# Patient Record
Sex: FEMALE | Race: WHITE | NOT HISPANIC OR LATINO | Employment: OTHER | URBAN - NONMETROPOLITAN AREA
[De-identification: names, ages, dates, MRNs, and addresses within clinical notes are randomized per-mention and may not be internally consistent; named-entity substitution may affect disease eponyms.]

---

## 2019-08-13 ENCOUNTER — OFFICE VISIT (OUTPATIENT)
Dept: INTERNAL MEDICINE | Facility: CLINIC | Age: 84
End: 2019-08-13

## 2019-08-13 VITALS
WEIGHT: 103.4 LBS | HEIGHT: 66 IN | TEMPERATURE: 99.2 F | OXYGEN SATURATION: 96 % | DIASTOLIC BLOOD PRESSURE: 84 MMHG | BODY MASS INDEX: 16.62 KG/M2 | SYSTOLIC BLOOD PRESSURE: 130 MMHG | RESPIRATION RATE: 16 BRPM | HEART RATE: 73 BPM

## 2019-08-13 DIAGNOSIS — I10 ESSENTIAL HYPERTENSION: Primary | ICD-10-CM

## 2019-08-13 DIAGNOSIS — E55.9 VITAMIN D DEFICIENCY: ICD-10-CM

## 2019-08-13 DIAGNOSIS — R63.4 WEIGHT LOSS: ICD-10-CM

## 2019-08-13 PROCEDURE — 99203 OFFICE O/P NEW LOW 30 MIN: CPT | Performed by: INTERNAL MEDICINE

## 2019-08-13 RX ORDER — ATENOLOL 50 MG/1
50 TABLET ORAL DAILY
Qty: 30 TABLET | Refills: 6 | Status: SHIPPED | OUTPATIENT
Start: 2019-08-13 | End: 2020-01-01 | Stop reason: SDUPTHER

## 2019-08-13 RX ORDER — HYDROCHLOROTHIAZIDE 25 MG/1
25 TABLET ORAL DAILY
Refills: 1 | COMMUNITY
Start: 2019-05-23 | End: 2019-08-13 | Stop reason: SDUPTHER

## 2019-08-13 RX ORDER — ERGOCALCIFEROL 1.25 MG/1
50000 CAPSULE ORAL WEEKLY
Qty: 5 CAPSULE | Refills: 2 | Status: SHIPPED | OUTPATIENT
Start: 2019-08-13 | End: 2019-09-19

## 2019-08-13 RX ORDER — ERGOCALCIFEROL 1.25 MG/1
50000 CAPSULE ORAL WEEKLY
Refills: 2 | COMMUNITY
Start: 2019-07-02 | End: 2019-08-13 | Stop reason: SDUPTHER

## 2019-08-13 RX ORDER — ATENOLOL 50 MG/1
50 TABLET ORAL DAILY
Refills: 6 | COMMUNITY
Start: 2019-07-12 | End: 2019-08-13 | Stop reason: SDUPTHER

## 2019-08-13 RX ORDER — HYDROCHLOROTHIAZIDE 25 MG/1
25 TABLET ORAL DAILY
Qty: 30 TABLET | Refills: 1 | Status: SHIPPED | OUTPATIENT
Start: 2019-08-13 | End: 2019-09-19

## 2019-08-13 NOTE — PROGRESS NOTES
Subjective     Chief Complaint   Patient presents with   • Establish Care   • Weight Loss       Weight Loss   This is a new problem. The current episode started more than 1 month ago. The problem occurs constantly. The problem has been gradually worsening. Associated symptoms include fatigue and weakness. Pertinent negatives include no abdominal pain, anorexia, arthralgias, chest pain, chills, coughing, fever, joint swelling, nausea or vomiting. Nothing aggravates the symptoms. She has tried eating for the symptoms. The treatment provided no relief.       Patient's PMR from outside medical facility reviewed and noted.    Review of Systems   Constitutional: Positive for fatigue, unexpected weight change and weight loss. Negative for activity change, appetite change, chills and fever.   HENT: Negative for mouth sores and trouble swallowing.         Wears dentures. [place] [employer].    Respiratory: Positive for shortness of breath. Negative for cough and wheezing.    Cardiovascular: Negative for chest pain, palpitations and leg swelling.   Gastrointestinal: Positive for constipation. Negative for abdominal pain, anorexia, diarrhea, nausea and vomiting.   Genitourinary: Negative for dysuria and hematuria.   Musculoskeletal: Negative for arthralgias and joint swelling.   Skin: Negative for color change and wound.   Neurological: Positive for weakness. Negative for speech difficulty.   Psychiatric/Behavioral: Negative for behavioral problems and confusion.      89-year-old  female who presents to [facility] and to discuss her weight loss.  She states that she has been losing weight recently.  She has no complaints, no joint pain.  She states that she is fatigued.  She states that she wears O2 at night, and occasionally in the morning she gets shortness of breath.  She continues to work every day.  She states that she sleeps good, sometimes all the time.  She states she is up-to-date with her mammogram, and 2 to 3  years ago and  build her last colonoscopy, he told her not to return.  The patient has occasional constipation.  She states over the last 6 months she is probably lost about 10 pounds.  She states that she eats an appropriate diet.  Last night for dinner she had green beans, potatoes with butter, pork chops, cottage cheese and tomato.  She denies any type of GI symptoms to include nausea blood in her stool or urine abdominal pain or diarrhea.  She denies any type of skin changes.  Patient did state that her previous PCP had done CAT scans and requested follow-up, but patient did not know what the changes were on scans and did not know why she requested follow-up.  The patient also continues to drive, and states that she feels like she is doing well.    Otherwise complete ROS reviewed and negative except as mentioned in the HPI.    Past Medical History:   Past Medical History:   Diagnosis Date   • Hypertension      Past Surgical History:  Past Surgical History:   Procedure Laterality Date   • CHOLECYSTECTOMY     • HYSTERECTOMY       Social History:  reports that she has never smoked. She has never used smokeless tobacco. She reports that she drinks alcohol. She reports that she does not use drugs.    Family History: family history includes Cancer in her sister; Heart attack in her brother; Lung disease in her father.       Allergies:  No Known Allergies  Medications:  Prior to Admission medications    Medication Sig Start Date End Date Taking? Authorizing Provider   atenolol (TENORMIN) 50 MG tablet Take 1 tablet by mouth Daily. 8/13/19  Yes Love Cordon, DO   hydrochlorothiazide (HYDRODIURIL) 25 MG tablet Take 1 tablet by mouth Daily. 8/13/19  Yes Love Cordon DO   atenolol (TENORMIN) 50 MG tablet Take 50 mg by mouth Daily. 7/12/19 8/13/19 Yes Juan Mo MD   hydrochlorothiazide (HYDRODIURIL) 25 MG tablet Take 25 mg by mouth Daily. 5/23/19 8/13/19 Yes Juan Mo MD   vitamin D  "(ERGOCALCIFEROL) 22748 units capsule capsule Take 1 capsule by mouth 1 (One) Time Per Week. 8/13/19   Love Cordon DO   vitamin D (ERGOCALCIFEROL) 01647 units capsule capsule Take 50,000 Units by mouth 1 (One) Time Per Week. 7/2/19 8/13/19  Provider, MD Juan       Objective     Vital Signs: /84 (BP Location: Left arm, Patient Position: Sitting, Cuff Size: Adult)   Pulse 73   Temp 99.2 °F (37.3 °C) (Temporal)   Resp 16   Ht 166.4 cm (65.5\")   Wt 46.9 kg (103 lb 6.4 oz)   SpO2 96%   Breastfeeding? No   BMI 16.94 kg/m²   Physical Exam   Constitutional:   Thin and frail   HENT:   Head: Normocephalic and atraumatic.   Nose: Nose normal.   Mouth/Throat: Oropharynx is clear and moist.   Right eye medial abnormality.   Eyes: Conjunctivae and EOM are normal. No scleral icterus.   Neck: Normal range of motion. Neck supple.   Cardiovascular: Normal rate, regular rhythm and normal heart sounds.   Pulmonary/Chest: Effort normal and breath sounds normal.   Abdominal: Soft. Bowel sounds are normal. There is no tenderness. There is no guarding.   Musculoskeletal: She exhibits no edema or tenderness.   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    Neurological: She is alert. No cranial nerve deficit. She exhibits normal muscle tone.   Skin: Skin is warm and dry.   Fine varicose veins, bilateral legs.    Psychiatric: She has a normal mood and affect. Thought content normal.   Vitals reviewed.      Patient's Body mass index is 16.94 kg/m². BMI is within normal parameters. No follow-up required..      Results Reviewed:  No results found for: GLUCOSE, BUN, CREATININE, NA, K, CL, CO2, CALCIUM, ALT, AST, WBC, HCT, PLT, CHOL, TRIG, HDL, LDL, LDLHDL, HGBA1C      Assessment / Plan     Assessment/Plan:  1. Essential hypertension  - atenolol (TENORMIN) 50 MG tablet; Take 1 tablet by mouth Daily.  Dispense: 30 tablet; Refill: 6  - hydrochlorothiazide (HYDRODIURIL) 25 MG tablet; Take 1 tablet by mouth " Daily.  Dispense: 30 tablet; Refill: 1  - Lipid panel; Future  - Lipid panel    Would like to review patient's CR and sodium. May need to DC HCTZ. Discussed holding this medication and following her BP at home.     2. Vitamin D deficiency  - vitamin D (ERGOCALCIFEROL) 68040 units capsule capsule; Take 1 capsule by mouth 1 (One) Time Per Week.  Dispense: 5 capsule; Refill: 2    3. Weight loss  - TSH; Future  - T4, free; Future    Will review patient's labs and CT from [facility]. Call or have nurses visit if anything needs to be followed.     Return in about 6 months (around 2/13/2020) for Next scheduled follow up. unless patient needs to be seen sooner or acute issues arise.    Code Status: Full       Love Cordon, DO   08/13/2019

## 2019-08-14 ENCOUNTER — TELEPHONE (OUTPATIENT)
Dept: INTERNAL MEDICINE | Facility: CLINIC | Age: 84
End: 2019-08-14

## 2019-08-14 NOTE — TELEPHONE ENCOUNTER
Attempted to contact patient regarding missed fasting lab appointment. Left voicemail on both home and mobile phone to please call office.

## 2019-08-15 ENCOUNTER — CLINICAL SUPPORT (OUTPATIENT)
Dept: INTERNAL MEDICINE | Facility: CLINIC | Age: 84
End: 2019-08-15

## 2019-08-15 ENCOUNTER — DOCUMENTATION (OUTPATIENT)
Dept: INTERNAL MEDICINE | Facility: CLINIC | Age: 84
End: 2019-08-15

## 2019-08-15 DIAGNOSIS — R91.8 ABNORMAL CT SCAN OF LUNG: Primary | ICD-10-CM

## 2019-08-15 NOTE — PROGRESS NOTES
Patient came into the office today for blood work. Had nurses visit. I got all the labs from [facility] and radiology reports. We discussed labs, she does not need the ordered labs for today, as they were already completed. And we discussed the CT reports which recommend repeat in a couple weeks. Will repeat CT chest today for concern of tumor and/or possible PNA.   Await results.  Love Cordon, DO

## 2019-08-20 ENCOUNTER — TELEPHONE (OUTPATIENT)
Dept: INTERNAL MEDICINE | Facility: CLINIC | Age: 84
End: 2019-08-20

## 2019-08-20 NOTE — TELEPHONE ENCOUNTER
Called [facility] for pt, to inform her that Dr Cordon wants to ref. Her to Pulmonologist,    (I did not leave this on voice mail,  Pt has poss endo broch lesion),   Dr Cordon said if pt has questions she would be happy to speak with pt.

## 2019-08-22 DIAGNOSIS — R91.8 ABNORMAL CT SCAN OF LUNG: ICD-10-CM

## 2019-08-26 ENCOUNTER — TELEPHONE (OUTPATIENT)
Dept: INTERNAL MEDICINE | Facility: CLINIC | Age: 84
End: 2019-08-26

## 2019-08-26 NOTE — TELEPHONE ENCOUNTER
Called and spoke with pt,  She wants to just wait and talk with Dr Cordon about all of this, I told her that would be fine.

## 2019-08-26 NOTE — TELEPHONE ENCOUNTER
Pt called and mentioned she was in Hospital from falling Sunday and broke her shoulder.  She had Surgery on Mon and came home on Tue.  Pt was letting Dr. Cordon know as well and hasn't received results from CT Scan.

## 2019-08-29 ENCOUNTER — OFFICE VISIT (OUTPATIENT)
Dept: INTERNAL MEDICINE | Facility: CLINIC | Age: 84
End: 2019-08-29

## 2019-08-29 DIAGNOSIS — S42.292D OTHER CLOSED DISPLACED FRACTURE OF PROXIMAL END OF LEFT HUMERUS WITH ROUTINE HEALING, SUBSEQUENT ENCOUNTER: ICD-10-CM

## 2019-08-29 DIAGNOSIS — R91.8 ABNORMAL CT LUNG SCREENING: ICD-10-CM

## 2019-08-29 DIAGNOSIS — K59.03 DRUG-INDUCED CONSTIPATION: Primary | ICD-10-CM

## 2019-08-29 DIAGNOSIS — R63.0 POOR APPETITE: ICD-10-CM

## 2019-08-29 PROBLEM — S42.202D CLOSED FRACTURE OF PROXIMAL END OF LEFT HUMERUS WITH ROUTINE HEALING: Status: ACTIVE | Noted: 2019-08-29

## 2019-08-29 PROCEDURE — 99213 OFFICE O/P EST LOW 20 MIN: CPT | Performed by: INTERNAL MEDICINE

## 2019-08-29 RX ORDER — HYDROCODONE BITARTRATE AND ACETAMINOPHEN 5; 325 MG/1; MG/1
1 TABLET ORAL TAKE AS DIRECTED
COMMUNITY
Start: 2019-08-28 | End: 2019-09-19

## 2019-08-29 RX ORDER — LACTULOSE 10 G/15ML
20 SOLUTION ORAL 2 TIMES DAILY PRN
Qty: 150 ML | Refills: 1 | Status: SHIPPED | OUTPATIENT
Start: 2019-08-29 | End: 2019-09-19

## 2019-08-29 RX ORDER — MEGESTROL ACETATE 40 MG/ML
800 SUSPENSION ORAL DAILY
Qty: 480 ML | Refills: 1 | Status: SHIPPED | OUTPATIENT
Start: 2019-08-29 | End: 2019-09-19

## 2019-08-29 NOTE — PROGRESS NOTES
Subjective     Chief Complaint   Patient presents with   • Follow-up     go over xray results.        Arm Pain    The incident occurred more than 1 week ago. The incident occurred at work. The injury mechanism was a fall. The pain is present in the upper left arm. The quality of the pain is described as stabbing. The pain does not radiate. The pain is mild. The pain has been constant since the incident. Pertinent negatives include no numbness or tingling. The symptoms are aggravated by movement. She has tried immobilization for the symptoms. The treatment provided mild relief.   Constipation   This is a new problem. The current episode started in the past 7 days. The problem is unchanged. Her stool frequency is 1 time per week or less. The patient is not on a high fiber diet. She does not exercise regularly. There has been adequate water intake. Associated symptoms include anorexia and weight loss. Pertinent negatives include no diarrhea. Risk factors include change in medication usage/dosage, immobility, recent illness and stress. She has tried nothing for the symptoms.     Left arm FX.  Rehab coming to her home. [facility] coming to check wound on RLE.   Dr. Rabago is taking care of patient. Had surgery.   Patient states that she is constipated. Has not moved bowels since coming home from the hospital.   Patient's PMR from outside medical facility reviewed and noted.     Review of Systems   Constitutional: Positive for weight loss.   Gastrointestinal: Positive for anorexia and constipation. Negative for diarrhea.   Musculoskeletal:        Left arm pain with recent fall.   Skin: Positive for wound.        Left shoulder area bandaged from surgery   Neurological: Negative for tingling and numbness.        Otherwise complete ROS reviewed and negative except as mentioned in the HPI.    Past Medical History:   Past Medical History:   Diagnosis Date   • Broken arm     left   • Hypertension      Past  Surgical History:  Past Surgical History:   Procedure Laterality Date   • CHOLECYSTECTOMY     • HYSTERECTOMY       Social History:  reports that she has never smoked. She has never used smokeless tobacco. She reports that she drinks alcohol. She reports that she does not use drugs.    Family History: family history includes Cancer in her sister; Heart attack in her brother; Lung disease in her father.       Allergies:  No Known Allergies  Medications:  Prior to Admission medications    Medication Sig Start Date End Date Taking? Authorizing Provider   atenolol (TENORMIN) 50 MG tablet Take 1 tablet by mouth Daily. 8/13/19   Love Cordon DO   hydrochlorothiazide (HYDRODIURIL) 25 MG tablet Take 1 tablet by mouth Daily. 8/13/19   Love Cordon DO   HYDROcodone-acetaminophen (NORCO) 5-325 MG per tablet Take 1 tablet by mouth Take As Directed. 8/28/19   Provider, MD Juan   vitamin D (ERGOCALCIFEROL) 05677 units capsule capsule Take 1 capsule by mouth 1 (One) Time Per Week. 8/13/19   Love Cordon DO       Objective     Vital Signs: There were no vitals taken for this visit.  Physical Exam   Constitutional:   Thin and frail.    HENT:   Head: Normocephalic and atraumatic.   Nose: Nose normal.   Mouth/Throat: Oropharynx is clear and moist.   Eyes: Conjunctivae and EOM are normal.   Neck: Normal range of motion. Neck supple.   Cardiovascular: Normal rate, regular rhythm and normal heart sounds.   Pulmonary/Chest: Effort normal and breath sounds normal.   Abdominal: She exhibits no distension.   Decreased bowel sounds   Musculoskeletal: She exhibits no edema or tenderness.   Left arm is in an immobilizer. Bandages clean with no discharge on the anterior shoulder.    Neurological: She is alert. No cranial nerve deficit.   Skin: Skin is warm and dry.   Right leg has purple bruising and discoloration. Most of the lower part of the leg is wrapped in a dressing, coban.    Psychiatric: She has a normal mood  and affect. Thought content normal.   Vitals reviewed.      Patient's There is no height or weight on file to calculate BMI. BMI is below normal parameters. Recommendations include: treating the underlying disease process.      Results Reviewed:  No results found for: GLUCOSE, BUN, CREATININE, NA, K, CL, CO2, CALCIUM, ALT, AST, WBC, HCT, PLT, CHOL, TRIG, HDL, LDL, LDLHDL, HGBA1C      Assessment / Plan     Assessment/Plan:  1. Drug-induced constipation  - lactulose (CHRONULAC) 10 GM/15ML solution; Take 30 mL by mouth 2 (Two) Times a Day As Needed (Constipation).  Dispense: 150 mL; Refill: 1  -May add OTC colace.   -If no BM by Monday patient instructed to call the office.     2. Poor appetite  - megestrol (MEGACE ORAL) 40 MG/ML suspension; Take 20 mL by mouth Daily.  Dispense: 480 mL; Refill: 1  -Patient states that she continues to use Ensure 1-2 times a day    3. Other closed displaced fracture of proximal end of left humerus with routine healing, subsequent encounter  -Patient had surgical repair by Dr. Rabago  -Patient is having PT and HH coming for rehab and wound care    4. Abnormal CT lung screening  -referring patient to pulmonology. We discussed CT scan in the office. Patient aware. Patient states that she would like to get over breaking her arm, before she has a bronchoscopy, if the pulmonologist feels this is necessary.         Return in about 3 months (around 11/29/2019). unless patient needs to be seen sooner or acute issues arise.    Code Status: Full    I have discussed the patient results/orders and and plan/recommendation with them at today's visit.      Love Cordon, DO   08/29/2019

## 2019-09-03 ENCOUNTER — TELEPHONE (OUTPATIENT)
Dept: INTERNAL MEDICINE | Facility: CLINIC | Age: 84
End: 2019-09-03

## 2019-09-03 NOTE — TELEPHONE ENCOUNTER
Per Dr. Cordon, Marisela called to check on Grace to find out status on how she is feeling and to be drinking Ensure 3X's per day.  Had to leave a .

## 2019-09-19 ENCOUNTER — HOSPITAL ENCOUNTER (INPATIENT)
Facility: HOSPITAL | Age: 84
LOS: 4 days | Discharge: NURSING, REHAB OR LONG-TERM CARE | End: 2019-09-23
Attending: EMERGENCY MEDICINE | Admitting: FAMILY MEDICINE

## 2019-09-19 ENCOUNTER — APPOINTMENT (OUTPATIENT)
Dept: GENERAL RADIOLOGY | Facility: HOSPITAL | Age: 84
End: 2019-09-19

## 2019-09-19 DIAGNOSIS — R09.02 HYPOXIA: Primary | ICD-10-CM

## 2019-09-19 DIAGNOSIS — R93.89 ABNORMAL CT SCAN, CHEST: ICD-10-CM

## 2019-09-19 DIAGNOSIS — Z74.09 IMPAIRED MOBILITY: ICD-10-CM

## 2019-09-19 DIAGNOSIS — Z78.9 DECREASED ACTIVITIES OF DAILY LIVING (ADL): ICD-10-CM

## 2019-09-19 DIAGNOSIS — I27.20 PULMONARY HYPERTENSION (HCC): ICD-10-CM

## 2019-09-19 LAB
ALBUMIN SERPL-MCNC: 3.7 G/DL (ref 3.5–5.2)
ALBUMIN/GLOB SERPL: 1.3 G/DL
ALP SERPL-CCNC: 105 U/L (ref 39–117)
ALT SERPL W P-5'-P-CCNC: 6 U/L (ref 1–33)
ANION GAP SERPL CALCULATED.3IONS-SCNC: 10 MMOL/L (ref 5–15)
AST SERPL-CCNC: 13 U/L (ref 1–32)
BASOPHILS # BLD AUTO: 0.07 10*3/MM3 (ref 0–0.2)
BASOPHILS NFR BLD AUTO: 0.7 % (ref 0–1.5)
BILIRUB SERPL-MCNC: 0.6 MG/DL (ref 0.2–1.2)
BUN BLD-MCNC: 15 MG/DL (ref 8–23)
BUN/CREAT SERPL: 30.6 (ref 7–25)
CALCIUM SPEC-SCNC: 9.2 MG/DL (ref 8.2–9.6)
CHLORIDE SERPL-SCNC: 107 MMOL/L (ref 98–107)
CO2 SERPL-SCNC: 28 MMOL/L (ref 22–29)
CREAT BLD-MCNC: 0.49 MG/DL (ref 0.57–1)
D-LACTATE SERPL-SCNC: 0.9 MMOL/L (ref 0.5–2)
DEPRECATED RDW RBC AUTO: 55.6 FL (ref 37–54)
EOSINOPHIL # BLD AUTO: 0.15 10*3/MM3 (ref 0–0.4)
EOSINOPHIL NFR BLD AUTO: 1.5 % (ref 0.3–6.2)
ERYTHROCYTE [DISTWIDTH] IN BLOOD BY AUTOMATED COUNT: 15.2 % (ref 12.3–15.4)
GFR SERPL CREATININE-BSD FRML MDRD: 118 ML/MIN/1.73
GLOBULIN UR ELPH-MCNC: 2.8 GM/DL
GLUCOSE BLD-MCNC: 140 MG/DL (ref 65–99)
HCT VFR BLD AUTO: 41.1 % (ref 34–46.6)
HGB BLD-MCNC: 13.4 G/DL (ref 12–15.9)
IMM GRANULOCYTES # BLD AUTO: 0.03 10*3/MM3 (ref 0–0.05)
IMM GRANULOCYTES NFR BLD AUTO: 0.3 % (ref 0–0.5)
LYMPHOCYTES # BLD AUTO: 0.55 10*3/MM3 (ref 0.7–3.1)
LYMPHOCYTES NFR BLD AUTO: 5.4 % (ref 19.6–45.3)
MCH RBC QN AUTO: 32.3 PG (ref 26.6–33)
MCHC RBC AUTO-ENTMCNC: 32.6 G/DL (ref 31.5–35.7)
MCV RBC AUTO: 99 FL (ref 79–97)
MONOCYTES # BLD AUTO: 0.91 10*3/MM3 (ref 0.1–0.9)
MONOCYTES NFR BLD AUTO: 8.9 % (ref 5–12)
NEUTROPHILS # BLD AUTO: 8.52 10*3/MM3 (ref 1.7–7)
NEUTROPHILS NFR BLD AUTO: 83.2 % (ref 42.7–76)
NRBC BLD AUTO-RTO: 0 /100 WBC (ref 0–0.2)
PLATELET # BLD AUTO: 318 10*3/MM3 (ref 140–450)
PMV BLD AUTO: 11.5 FL (ref 6–12)
POTASSIUM BLD-SCNC: 4.4 MMOL/L (ref 3.5–5.2)
PROT SERPL-MCNC: 6.5 G/DL (ref 6–8.5)
RBC # BLD AUTO: 4.15 10*6/MM3 (ref 3.77–5.28)
SODIUM BLD-SCNC: 145 MMOL/L (ref 136–145)
WBC NRBC COR # BLD: 10.23 10*3/MM3 (ref 3.4–10.8)

## 2019-09-19 PROCEDURE — 83605 ASSAY OF LACTIC ACID: CPT | Performed by: EMERGENCY MEDICINE

## 2019-09-19 PROCEDURE — 25010000002 MEROPENEM PER 100 MG: Performed by: INTERNAL MEDICINE

## 2019-09-19 PROCEDURE — 99284 EMERGENCY DEPT VISIT MOD MDM: CPT

## 2019-09-19 PROCEDURE — 94799 UNLISTED PULMONARY SVC/PX: CPT

## 2019-09-19 PROCEDURE — 87040 BLOOD CULTURE FOR BACTERIA: CPT | Performed by: EMERGENCY MEDICINE

## 2019-09-19 PROCEDURE — 80053 COMPREHEN METABOLIC PANEL: CPT | Performed by: EMERGENCY MEDICINE

## 2019-09-19 PROCEDURE — 71045 X-RAY EXAM CHEST 1 VIEW: CPT

## 2019-09-19 PROCEDURE — 85025 COMPLETE CBC W/AUTO DIFF WBC: CPT | Performed by: EMERGENCY MEDICINE

## 2019-09-19 RX ORDER — MEGESTROL ACETATE 40 MG/ML
800 SUSPENSION ORAL DAILY
Status: DISCONTINUED | OUTPATIENT
Start: 2019-09-20 | End: 2019-09-23 | Stop reason: HOSPADM

## 2019-09-19 RX ORDER — SODIUM CHLORIDE 0.9 % (FLUSH) 0.9 %
10 SYRINGE (ML) INJECTION AS NEEDED
Status: DISCONTINUED | OUTPATIENT
Start: 2019-09-19 | End: 2019-09-23 | Stop reason: HOSPADM

## 2019-09-19 RX ORDER — GUAIFENESIN 600 MG/1
1200 TABLET, EXTENDED RELEASE ORAL EVERY 12 HOURS SCHEDULED
Status: DISCONTINUED | OUTPATIENT
Start: 2019-09-19 | End: 2019-09-23 | Stop reason: HOSPADM

## 2019-09-19 RX ORDER — ATENOLOL 50 MG/1
50 TABLET ORAL DAILY
Status: DISCONTINUED | OUTPATIENT
Start: 2019-09-20 | End: 2019-09-23 | Stop reason: HOSPADM

## 2019-09-19 RX ORDER — SODIUM CHLORIDE 0.9 % (FLUSH) 0.9 %
10 SYRINGE (ML) INJECTION EVERY 12 HOURS SCHEDULED
Status: DISCONTINUED | OUTPATIENT
Start: 2019-09-19 | End: 2019-09-23 | Stop reason: HOSPADM

## 2019-09-19 RX ORDER — ONDANSETRON 2 MG/ML
4 INJECTION INTRAMUSCULAR; INTRAVENOUS EVERY 6 HOURS PRN
Status: DISCONTINUED | OUTPATIENT
Start: 2019-09-19 | End: 2019-09-23 | Stop reason: HOSPADM

## 2019-09-19 RX ORDER — ACETAMINOPHEN 650 MG/1
650 SUPPOSITORY RECTAL EVERY 4 HOURS PRN
Status: DISCONTINUED | OUTPATIENT
Start: 2019-09-19 | End: 2019-09-23 | Stop reason: HOSPADM

## 2019-09-19 RX ORDER — LACTULOSE 20 G/30ML
20 SOLUTION ORAL 2 TIMES DAILY PRN
Status: DISCONTINUED | OUTPATIENT
Start: 2019-09-19 | End: 2019-09-23 | Stop reason: HOSPADM

## 2019-09-19 RX ORDER — ACETAMINOPHEN 160 MG/5ML
650 SOLUTION ORAL EVERY 4 HOURS PRN
Status: DISCONTINUED | OUTPATIENT
Start: 2019-09-19 | End: 2019-09-23 | Stop reason: HOSPADM

## 2019-09-19 RX ORDER — HYDROCODONE BITARTRATE AND ACETAMINOPHEN 5; 325 MG/1; MG/1
1 TABLET ORAL TAKE AS DIRECTED
Status: DISCONTINUED | OUTPATIENT
Start: 2019-09-19 | End: 2019-09-20

## 2019-09-19 RX ORDER — ACETAMINOPHEN 325 MG/1
650 TABLET ORAL EVERY 4 HOURS PRN
Status: DISCONTINUED | OUTPATIENT
Start: 2019-09-19 | End: 2019-09-23 | Stop reason: HOSPADM

## 2019-09-19 RX ORDER — HYDROCHLOROTHIAZIDE 25 MG/1
25 TABLET ORAL DAILY
Status: DISCONTINUED | OUTPATIENT
Start: 2019-09-20 | End: 2019-09-23 | Stop reason: HOSPADM

## 2019-09-19 RX ADMIN — MEROPENEM 1 G: 1 INJECTION, POWDER, FOR SOLUTION INTRAVENOUS at 23:27

## 2019-09-19 RX ADMIN — GUAIFENESIN 1200 MG: 600 TABLET, EXTENDED RELEASE ORAL at 23:26

## 2019-09-20 ENCOUNTER — APPOINTMENT (OUTPATIENT)
Dept: CARDIOLOGY | Facility: HOSPITAL | Age: 84
End: 2019-09-20

## 2019-09-20 PROBLEM — R63.4 WEIGHT LOSS: Status: ACTIVE | Noted: 2019-09-20

## 2019-09-20 PROBLEM — R93.89 ABNORMAL CT SCAN, CHEST: Status: ACTIVE | Noted: 2019-09-20

## 2019-09-20 PROBLEM — J18.9 CAP (COMMUNITY ACQUIRED PNEUMONIA): Status: ACTIVE | Noted: 2019-09-20

## 2019-09-20 PROBLEM — R06.00 DYSPNEA: Status: ACTIVE | Noted: 2019-09-20

## 2019-09-20 PROBLEM — R54 ADVANCED AGE: Status: ACTIVE | Noted: 2019-09-20

## 2019-09-20 PROBLEM — R62.7 FAILURE TO THRIVE IN ADULT: Status: ACTIVE | Noted: 2019-09-20

## 2019-09-20 PROBLEM — J90 PLEURAL EFFUSION ON RIGHT: Status: ACTIVE | Noted: 2019-09-20

## 2019-09-20 PROBLEM — K59.00 CONSTIPATION: Status: ACTIVE | Noted: 2019-09-20

## 2019-09-20 PROBLEM — E44.1 MILD MALNUTRITION (HCC): Status: ACTIVE | Noted: 2019-09-20

## 2019-09-20 LAB
ALBUMIN SERPL-MCNC: 3.5 G/DL (ref 3.5–5.2)
ALBUMIN/GLOB SERPL: 1.3 G/DL
ALP SERPL-CCNC: 95 U/L (ref 39–117)
ALT SERPL W P-5'-P-CCNC: 5 U/L (ref 1–33)
ANION GAP SERPL CALCULATED.3IONS-SCNC: 8 MMOL/L (ref 5–15)
AST SERPL-CCNC: 11 U/L (ref 1–32)
BASOPHILS # BLD AUTO: 0.07 10*3/MM3 (ref 0–0.2)
BASOPHILS NFR BLD AUTO: 0.9 % (ref 0–1.5)
BH CV ECHO MEAS - AO MAX PG (FULL): 2.4 MMHG
BH CV ECHO MEAS - AO MAX PG: 5.5 MMHG
BH CV ECHO MEAS - AO MEAN PG (FULL): 2 MMHG
BH CV ECHO MEAS - AO MEAN PG: 3 MMHG
BH CV ECHO MEAS - AO ROOT AREA (BSA CORRECTED): 2
BH CV ECHO MEAS - AO ROOT AREA: 6.6 CM^2
BH CV ECHO MEAS - AO ROOT DIAM: 2.9 CM
BH CV ECHO MEAS - AO V2 MAX: 117 CM/SEC
BH CV ECHO MEAS - AO V2 MEAN: 81.9 CM/SEC
BH CV ECHO MEAS - AO V2 VTI: 22 CM
BH CV ECHO MEAS - AVA(I,A): 2.9 CM^2
BH CV ECHO MEAS - AVA(I,D): 2.9 CM^2
BH CV ECHO MEAS - AVA(V,A): 2.6 CM^2
BH CV ECHO MEAS - AVA(V,D): 2.6 CM^2
BH CV ECHO MEAS - BSA(HAYCOCK): 1.4 M^2
BH CV ECHO MEAS - BSA: 1.5 M^2
BH CV ECHO MEAS - BZI_BMI: 16.3 KILOGRAMS/M^2
BH CV ECHO MEAS - BZI_METRIC_HEIGHT: 165.1 CM
BH CV ECHO MEAS - BZI_METRIC_WEIGHT: 44.5 KG
BH CV ECHO MEAS - EDV(CUBED): 49 ML
BH CV ECHO MEAS - EDV(MOD-SP4): 82.5 ML
BH CV ECHO MEAS - EDV(TEICH): 56.6 ML
BH CV ECHO MEAS - EF(CUBED): 70.4 %
BH CV ECHO MEAS - EF(MOD-SP4): 70.9 %
BH CV ECHO MEAS - EF(TEICH): 62.9 %
BH CV ECHO MEAS - ESV(CUBED): 14.5 ML
BH CV ECHO MEAS - ESV(MOD-SP4): 24 ML
BH CV ECHO MEAS - ESV(TEICH): 21 ML
BH CV ECHO MEAS - FS: 33.3 %
BH CV ECHO MEAS - IVS/LVPW: 1.1
BH CV ECHO MEAS - IVSD: 1 CM
BH CV ECHO MEAS - LA DIMENSION: 4.2 CM
BH CV ECHO MEAS - LA/AO: 1.4
BH CV ECHO MEAS - LAT PEAK E' VEL: 5.6 CM/SEC
BH CV ECHO MEAS - LV DIASTOLIC VOL/BSA (35-75): 56.5 ML/M^2
BH CV ECHO MEAS - LV MASS(C)D: 107.3 GRAMS
BH CV ECHO MEAS - LV MASS(C)DI: 73.5 GRAMS/M^2
BH CV ECHO MEAS - LV MAX PG: 3.1 MMHG
BH CV ECHO MEAS - LV MEAN PG: 1 MMHG
BH CV ECHO MEAS - LV SYSTOLIC VOL/BSA (12-30): 16.4 ML/M^2
BH CV ECHO MEAS - LV V1 MAX: 87.6 CM/SEC
BH CV ECHO MEAS - LV V1 MEAN: 56.2 CM/SEC
BH CV ECHO MEAS - LV V1 VTI: 18.3 CM
BH CV ECHO MEAS - LVIDD: 3.7 CM
BH CV ECHO MEAS - LVIDS: 2.4 CM
BH CV ECHO MEAS - LVLD AP4: 7.4 CM
BH CV ECHO MEAS - LVLS AP4: 5.9 CM
BH CV ECHO MEAS - LVOT AREA (M): 3.5 CM^2
BH CV ECHO MEAS - LVOT AREA: 3.5 CM^2
BH CV ECHO MEAS - LVOT DIAM: 2.1 CM
BH CV ECHO MEAS - LVPWD: 0.92 CM
BH CV ECHO MEAS - MED PEAK E' VEL: 5 CM/SEC
BH CV ECHO MEAS - MV A MAX VEL: 130 CM/SEC
BH CV ECHO MEAS - MV DEC SLOPE: 240 CM/SEC^2
BH CV ECHO MEAS - MV DEC TIME: 0.36 SEC
BH CV ECHO MEAS - MV E MAX VEL: 85.2 CM/SEC
BH CV ECHO MEAS - MV E/A: 0.66
BH CV ECHO MEAS - RAP SYSTOLE: 5 MMHG
BH CV ECHO MEAS - RVSP: 57.7 MMHG
BH CV ECHO MEAS - SI(AO): 99.5 ML/M^2
BH CV ECHO MEAS - SI(CUBED): 23.6 ML/M^2
BH CV ECHO MEAS - SI(LVOT): 43.4 ML/M^2
BH CV ECHO MEAS - SI(MOD-SP4): 40 ML/M^2
BH CV ECHO MEAS - SI(TEICH): 24.4 ML/M^2
BH CV ECHO MEAS - SV(AO): 145.3 ML
BH CV ECHO MEAS - SV(CUBED): 34.5 ML
BH CV ECHO MEAS - SV(LVOT): 63.4 ML
BH CV ECHO MEAS - SV(MOD-SP4): 58.5 ML
BH CV ECHO MEAS - SV(TEICH): 35.6 ML
BH CV ECHO MEAS - TR MAX VEL: 363 CM/SEC
BH CV ECHO MEASUREMENTS AVERAGE E/E' RATIO: 16.08
BILIRUB SERPL-MCNC: 0.6 MG/DL (ref 0.2–1.2)
BUN BLD-MCNC: 14 MG/DL (ref 8–23)
BUN/CREAT SERPL: 34.1 (ref 7–25)
CALCIUM SPEC-SCNC: 8.9 MG/DL (ref 8.2–9.6)
CHLORIDE SERPL-SCNC: 108 MMOL/L (ref 98–107)
CHOLEST SERPL-MCNC: 132 MG/DL (ref 0–200)
CO2 SERPL-SCNC: 29 MMOL/L (ref 22–29)
CREAT BLD-MCNC: 0.41 MG/DL (ref 0.57–1)
DEPRECATED RDW RBC AUTO: 56.3 FL (ref 37–54)
EOSINOPHIL # BLD AUTO: 0.17 10*3/MM3 (ref 0–0.4)
EOSINOPHIL NFR BLD AUTO: 2.1 % (ref 0.3–6.2)
ERYTHROCYTE [DISTWIDTH] IN BLOOD BY AUTOMATED COUNT: 15.2 % (ref 12.3–15.4)
ERYTHROCYTE [SEDIMENTATION RATE] IN BLOOD: 9 MM/HR (ref 0–20)
GFR SERPL CREATININE-BSD FRML MDRD: 145 ML/MIN/1.73
GLOBULIN UR ELPH-MCNC: 2.7 GM/DL
GLUCOSE BLD-MCNC: 110 MG/DL (ref 65–99)
HCT VFR BLD AUTO: 39.3 % (ref 34–46.6)
HDLC SERPL-MCNC: 49 MG/DL (ref 40–60)
HGB BLD-MCNC: 12.7 G/DL (ref 12–15.9)
IMM GRANULOCYTES # BLD AUTO: 0.03 10*3/MM3 (ref 0–0.05)
IMM GRANULOCYTES NFR BLD AUTO: 0.4 % (ref 0–0.5)
LDLC SERPL CALC-MCNC: 67 MG/DL (ref 0–100)
LDLC/HDLC SERPL: 1.38 {RATIO}
LEFT ATRIUM VOLUME INDEX: 45.2 ML/M2
LEFT ATRIUM VOLUME: 66 CM3
LV EF 2D ECHO EST: 70 %
LYMPHOCYTES # BLD AUTO: 0.68 10*3/MM3 (ref 0.7–3.1)
LYMPHOCYTES NFR BLD AUTO: 8.4 % (ref 19.6–45.3)
MAGNESIUM SERPL-MCNC: 2.3 MG/DL (ref 1.7–2.3)
MAXIMAL PREDICTED HEART RATE: 128 BPM
MCH RBC QN AUTO: 32.4 PG (ref 26.6–33)
MCHC RBC AUTO-ENTMCNC: 32.3 G/DL (ref 31.5–35.7)
MCV RBC AUTO: 100.3 FL (ref 79–97)
MONOCYTES # BLD AUTO: 0.79 10*3/MM3 (ref 0.1–0.9)
MONOCYTES NFR BLD AUTO: 9.8 % (ref 5–12)
NEUTROPHILS # BLD AUTO: 6.33 10*3/MM3 (ref 1.7–7)
NEUTROPHILS NFR BLD AUTO: 78.4 % (ref 42.7–76)
NRBC BLD AUTO-RTO: 0 /100 WBC (ref 0–0.2)
PHOSPHATE SERPL-MCNC: 3.4 MG/DL (ref 2.5–4.5)
PLATELET # BLD AUTO: 269 10*3/MM3 (ref 140–450)
PMV BLD AUTO: 11.6 FL (ref 6–12)
POTASSIUM BLD-SCNC: 4.2 MMOL/L (ref 3.5–5.2)
PROT SERPL-MCNC: 6.2 G/DL (ref 6–8.5)
RBC # BLD AUTO: 3.92 10*6/MM3 (ref 3.77–5.28)
S PNEUM AG SPEC QL LA: NEGATIVE
SODIUM BLD-SCNC: 145 MMOL/L (ref 136–145)
STRESS TARGET HR: 109 BPM
TRIGL SERPL-MCNC: 78 MG/DL (ref 0–150)
TROPONIN T SERPL-MCNC: 0.03 NG/ML (ref 0–0.03)
TROPONIN T SERPL-MCNC: 0.04 NG/ML (ref 0–0.03)
VLDLC SERPL-MCNC: 15.6 MG/DL
WBC NRBC COR # BLD: 8.07 10*3/MM3 (ref 3.4–10.8)

## 2019-09-20 PROCEDURE — 94799 UNLISTED PULMONARY SVC/PX: CPT

## 2019-09-20 PROCEDURE — 85025 COMPLETE CBC W/AUTO DIFF WBC: CPT | Performed by: INTERNAL MEDICINE

## 2019-09-20 PROCEDURE — 25010000002 MEROPENEM PER 100 MG: Performed by: INTERNAL MEDICINE

## 2019-09-20 PROCEDURE — 97166 OT EVAL MOD COMPLEX 45 MIN: CPT

## 2019-09-20 PROCEDURE — 84484 ASSAY OF TROPONIN QUANT: CPT | Performed by: NURSE PRACTITIONER

## 2019-09-20 PROCEDURE — 84100 ASSAY OF PHOSPHORUS: CPT | Performed by: INTERNAL MEDICINE

## 2019-09-20 PROCEDURE — 94760 N-INVAS EAR/PLS OXIMETRY 1: CPT

## 2019-09-20 PROCEDURE — 25010000002 PERFLUTREN 6.52 MG/ML SUSPENSION: Performed by: INTERNAL MEDICINE

## 2019-09-20 PROCEDURE — 87899 AGENT NOS ASSAY W/OPTIC: CPT | Performed by: NURSE PRACTITIONER

## 2019-09-20 PROCEDURE — 85651 RBC SED RATE NONAUTOMATED: CPT | Performed by: INTERNAL MEDICINE

## 2019-09-20 PROCEDURE — 80053 COMPREHEN METABOLIC PANEL: CPT | Performed by: INTERNAL MEDICINE

## 2019-09-20 PROCEDURE — 84484 ASSAY OF TROPONIN QUANT: CPT | Performed by: INTERNAL MEDICINE

## 2019-09-20 PROCEDURE — 83735 ASSAY OF MAGNESIUM: CPT | Performed by: INTERNAL MEDICINE

## 2019-09-20 PROCEDURE — 93306 TTE W/DOPPLER COMPLETE: CPT | Performed by: INTERNAL MEDICINE

## 2019-09-20 PROCEDURE — 80061 LIPID PANEL: CPT | Performed by: INTERNAL MEDICINE

## 2019-09-20 PROCEDURE — 93306 TTE W/DOPPLER COMPLETE: CPT

## 2019-09-20 PROCEDURE — 97162 PT EVAL MOD COMPLEX 30 MIN: CPT

## 2019-09-20 PROCEDURE — 99222 1ST HOSP IP/OBS MODERATE 55: CPT | Performed by: INTERNAL MEDICINE

## 2019-09-20 RX ORDER — HYDROCODONE BITARTRATE AND ACETAMINOPHEN 5; 325 MG/1; MG/1
1 TABLET ORAL EVERY 8 HOURS PRN
Status: DISCONTINUED | OUTPATIENT
Start: 2019-09-20 | End: 2019-09-23 | Stop reason: HOSPADM

## 2019-09-20 RX ORDER — AZITHROMYCIN 250 MG/1
250 TABLET, FILM COATED ORAL
Status: DISCONTINUED | OUTPATIENT
Start: 2019-09-20 | End: 2019-09-22

## 2019-09-20 RX ORDER — HYDROCODONE BITARTRATE AND ACETAMINOPHEN 5; 325 MG/1; MG/1
1 TABLET ORAL EVERY 12 HOURS PRN
Status: DISCONTINUED | OUTPATIENT
Start: 2019-09-20 | End: 2019-09-20

## 2019-09-20 RX ADMIN — ALBUTEROL SULFATE 1.25 MG: 2.5 SOLUTION RESPIRATORY (INHALATION) at 14:50

## 2019-09-20 RX ADMIN — PERFLUTREN 8.48 MG: 6.52 INJECTION, SUSPENSION INTRAVENOUS at 14:15

## 2019-09-20 RX ADMIN — HYDROCODONE BITARTRATE AND ACETAMINOPHEN 1 TABLET: 5; 325 TABLET ORAL at 22:12

## 2019-09-20 RX ADMIN — HYDROCHLOROTHIAZIDE 25 MG: 25 TABLET ORAL at 09:20

## 2019-09-20 RX ADMIN — MEROPENEM 1 G: 1 INJECTION, POWDER, FOR SOLUTION INTRAVENOUS at 06:09

## 2019-09-20 RX ADMIN — MEROPENEM 1 G: 1 INJECTION, POWDER, FOR SOLUTION INTRAVENOUS at 10:37

## 2019-09-20 RX ADMIN — ALBUTEROL SULFATE 1.25 MG: 2.5 SOLUTION RESPIRATORY (INHALATION) at 20:25

## 2019-09-20 RX ADMIN — ACETAMINOPHEN 650 MG: 325 TABLET, FILM COATED ORAL at 08:33

## 2019-09-20 RX ADMIN — ACETAMINOPHEN 650 MG: 325 TABLET, FILM COATED ORAL at 18:25

## 2019-09-20 RX ADMIN — AZITHROMYCIN 250 MG: 250 TABLET, FILM COATED ORAL at 17:22

## 2019-09-20 RX ADMIN — ATENOLOL 50 MG: 50 TABLET ORAL at 09:20

## 2019-09-20 RX ADMIN — GUAIFENESIN 1200 MG: 600 TABLET, EXTENDED RELEASE ORAL at 21:41

## 2019-09-20 RX ADMIN — ALBUTEROL SULFATE 1.25 MG: 2.5 SOLUTION RESPIRATORY (INHALATION) at 11:12

## 2019-09-20 RX ADMIN — GUAIFENESIN 1200 MG: 600 TABLET, EXTENDED RELEASE ORAL at 09:19

## 2019-09-21 ENCOUNTER — APPOINTMENT (OUTPATIENT)
Dept: CT IMAGING | Facility: HOSPITAL | Age: 84
End: 2019-09-21

## 2019-09-21 PROBLEM — I26.99 ACUTE PULMONARY EMBOLISM: Status: ACTIVE | Noted: 2019-09-21

## 2019-09-21 PROBLEM — I26.99 PULMONARY EMBOLISM: Status: ACTIVE | Noted: 2019-09-21

## 2019-09-21 LAB
ANION GAP SERPL CALCULATED.3IONS-SCNC: 14 MMOL/L (ref 5–15)
BASOPHILS # BLD AUTO: 0.07 10*3/MM3 (ref 0–0.2)
BASOPHILS NFR BLD AUTO: 0.7 % (ref 0–1.5)
BUN BLD-MCNC: 16 MG/DL (ref 8–23)
BUN/CREAT SERPL: 33.3 (ref 7–25)
CALCIUM SPEC-SCNC: 9 MG/DL (ref 8.2–9.6)
CHLORIDE SERPL-SCNC: 102 MMOL/L (ref 98–107)
CO2 SERPL-SCNC: 24 MMOL/L (ref 22–29)
CREAT BLD-MCNC: 0.48 MG/DL (ref 0.57–1)
DEPRECATED RDW RBC AUTO: 56.2 FL (ref 37–54)
EOSINOPHIL # BLD AUTO: 0.26 10*3/MM3 (ref 0–0.4)
EOSINOPHIL NFR BLD AUTO: 2.6 % (ref 0.3–6.2)
ERYTHROCYTE [DISTWIDTH] IN BLOOD BY AUTOMATED COUNT: 15.2 % (ref 12.3–15.4)
GFR SERPL CREATININE-BSD FRML MDRD: 121 ML/MIN/1.73
GLUCOSE BLD-MCNC: 103 MG/DL (ref 65–99)
HCT VFR BLD AUTO: 45.4 % (ref 34–46.6)
HGB BLD-MCNC: 14.5 G/DL (ref 12–15.9)
IMM GRANULOCYTES # BLD AUTO: 0.06 10*3/MM3 (ref 0–0.05)
IMM GRANULOCYTES NFR BLD AUTO: 0.6 % (ref 0–0.5)
L PNEUMO1 AG UR QL IA: NEGATIVE
LYMPHOCYTES # BLD AUTO: 1.17 10*3/MM3 (ref 0.7–3.1)
LYMPHOCYTES NFR BLD AUTO: 11.9 % (ref 19.6–45.3)
MCH RBC QN AUTO: 32.7 PG (ref 26.6–33)
MCHC RBC AUTO-ENTMCNC: 31.9 G/DL (ref 31.5–35.7)
MCV RBC AUTO: 102.3 FL (ref 79–97)
MONOCYTES # BLD AUTO: 1.17 10*3/MM3 (ref 0.1–0.9)
MONOCYTES NFR BLD AUTO: 11.9 % (ref 5–12)
NEUTROPHILS # BLD AUTO: 7.09 10*3/MM3 (ref 1.7–7)
NEUTROPHILS NFR BLD AUTO: 72.3 % (ref 42.7–76)
NRBC BLD AUTO-RTO: 0 /100 WBC (ref 0–0.2)
PLATELET # BLD AUTO: 250 10*3/MM3 (ref 140–450)
PMV BLD AUTO: 12.3 FL (ref 6–12)
POTASSIUM BLD-SCNC: 4.2 MMOL/L (ref 3.5–5.2)
RBC # BLD AUTO: 4.44 10*6/MM3 (ref 3.77–5.28)
SODIUM BLD-SCNC: 140 MMOL/L (ref 136–145)
WBC NRBC COR # BLD: 9.82 10*3/MM3 (ref 3.4–10.8)

## 2019-09-21 PROCEDURE — 94760 N-INVAS EAR/PLS OXIMETRY 1: CPT

## 2019-09-21 PROCEDURE — 25010000002 ENOXAPARIN PER 10 MG: Performed by: INTERNAL MEDICINE

## 2019-09-21 PROCEDURE — 85025 COMPLETE CBC W/AUTO DIFF WBC: CPT | Performed by: NURSE PRACTITIONER

## 2019-09-21 PROCEDURE — 94799 UNLISTED PULMONARY SVC/PX: CPT

## 2019-09-21 PROCEDURE — 94618 PULMONARY STRESS TESTING: CPT

## 2019-09-21 PROCEDURE — 94640 AIRWAY INHALATION TREATMENT: CPT

## 2019-09-21 PROCEDURE — 80048 BASIC METABOLIC PNL TOTAL CA: CPT | Performed by: NURSE PRACTITIONER

## 2019-09-21 PROCEDURE — 71275 CT ANGIOGRAPHY CHEST: CPT

## 2019-09-21 PROCEDURE — 0 IOPAMIDOL PER 1 ML: Performed by: INTERNAL MEDICINE

## 2019-09-21 RX ORDER — AZITHROMYCIN 500 MG/1
TABLET, FILM COATED ORAL
Qty: 4 TABLET | Refills: 0 | Status: SHIPPED | OUTPATIENT
Start: 2019-09-21 | End: 2019-09-23 | Stop reason: HOSPADM

## 2019-09-21 RX ADMIN — AZITHROMYCIN 250 MG: 250 TABLET, FILM COATED ORAL at 17:32

## 2019-09-21 RX ADMIN — IOPAMIDOL 100 ML: 755 INJECTION, SOLUTION INTRAVENOUS at 15:50

## 2019-09-21 RX ADMIN — MEGESTROL ACETATE 800 MG: 40 SUSPENSION ORAL at 08:39

## 2019-09-21 RX ADMIN — GUAIFENESIN 1200 MG: 600 TABLET, EXTENDED RELEASE ORAL at 21:09

## 2019-09-21 RX ADMIN — SODIUM CHLORIDE, PRESERVATIVE FREE 10 ML: 5 INJECTION INTRAVENOUS at 21:59

## 2019-09-21 RX ADMIN — ALBUTEROL SULFATE 1.25 MG: 2.5 SOLUTION RESPIRATORY (INHALATION) at 07:50

## 2019-09-21 RX ADMIN — ENOXAPARIN SODIUM 40 MG: 40 INJECTION SUBCUTANEOUS at 17:32

## 2019-09-21 RX ADMIN — GUAIFENESIN 1200 MG: 600 TABLET, EXTENDED RELEASE ORAL at 08:39

## 2019-09-22 ENCOUNTER — APPOINTMENT (OUTPATIENT)
Dept: ULTRASOUND IMAGING | Facility: HOSPITAL | Age: 84
End: 2019-09-22

## 2019-09-22 PROCEDURE — 97535 SELF CARE MNGMENT TRAINING: CPT

## 2019-09-22 PROCEDURE — 94799 UNLISTED PULMONARY SVC/PX: CPT

## 2019-09-22 PROCEDURE — 97110 THERAPEUTIC EXERCISES: CPT

## 2019-09-22 PROCEDURE — 25010000002 ENOXAPARIN PER 10 MG: Performed by: INTERNAL MEDICINE

## 2019-09-22 PROCEDURE — 97116 GAIT TRAINING THERAPY: CPT

## 2019-09-22 PROCEDURE — 93970 EXTREMITY STUDY: CPT | Performed by: SURGERY

## 2019-09-22 PROCEDURE — 94760 N-INVAS EAR/PLS OXIMETRY 1: CPT

## 2019-09-22 PROCEDURE — 93970 EXTREMITY STUDY: CPT

## 2019-09-22 RX ADMIN — ATENOLOL 50 MG: 50 TABLET ORAL at 08:27

## 2019-09-22 RX ADMIN — ALBUTEROL SULFATE 1.25 MG: 2.5 SOLUTION RESPIRATORY (INHALATION) at 11:10

## 2019-09-22 RX ADMIN — SODIUM CHLORIDE, PRESERVATIVE FREE 10 ML: 5 INJECTION INTRAVENOUS at 08:26

## 2019-09-22 RX ADMIN — ENOXAPARIN SODIUM 40 MG: 40 INJECTION SUBCUTANEOUS at 05:28

## 2019-09-22 RX ADMIN — HYDROCHLOROTHIAZIDE 25 MG: 25 TABLET ORAL at 08:27

## 2019-09-22 RX ADMIN — GUAIFENESIN 1200 MG: 600 TABLET, EXTENDED RELEASE ORAL at 19:43

## 2019-09-22 RX ADMIN — ENOXAPARIN SODIUM 40 MG: 40 INJECTION SUBCUTANEOUS at 17:29

## 2019-09-22 RX ADMIN — ALBUTEROL SULFATE 1.25 MG: 2.5 SOLUTION RESPIRATORY (INHALATION) at 07:01

## 2019-09-22 RX ADMIN — GUAIFENESIN 1200 MG: 600 TABLET, EXTENDED RELEASE ORAL at 08:27

## 2019-09-22 RX ADMIN — MEGESTROL ACETATE 800 MG: 40 SUSPENSION ORAL at 08:27

## 2019-09-22 RX ADMIN — SODIUM CHLORIDE, PRESERVATIVE FREE 10 ML: 5 INJECTION INTRAVENOUS at 19:43

## 2019-09-23 VITALS
OXYGEN SATURATION: 93 % | SYSTOLIC BLOOD PRESSURE: 92 MMHG | DIASTOLIC BLOOD PRESSURE: 45 MMHG | BODY MASS INDEX: 16.41 KG/M2 | HEART RATE: 78 BPM | RESPIRATION RATE: 16 BRPM | TEMPERATURE: 98.2 F | WEIGHT: 98.5 LBS | HEIGHT: 65 IN

## 2019-09-23 LAB
ANION GAP SERPL CALCULATED.3IONS-SCNC: 8 MMOL/L (ref 5–15)
BASOPHILS # BLD AUTO: 0.06 10*3/MM3 (ref 0–0.2)
BASOPHILS NFR BLD AUTO: 0.9 % (ref 0–1.5)
BUN BLD-MCNC: 12 MG/DL (ref 8–23)
BUN/CREAT SERPL: 28.6 (ref 7–25)
CALCIUM SPEC-SCNC: 9.1 MG/DL (ref 8.2–9.6)
CHLORIDE SERPL-SCNC: 104 MMOL/L (ref 98–107)
CO2 SERPL-SCNC: 31 MMOL/L (ref 22–29)
CREAT BLD-MCNC: 0.42 MG/DL (ref 0.57–1)
DEPRECATED RDW RBC AUTO: 55.7 FL (ref 37–54)
EOSINOPHIL # BLD AUTO: 0.26 10*3/MM3 (ref 0–0.4)
EOSINOPHIL NFR BLD AUTO: 3.7 % (ref 0.3–6.2)
ERYTHROCYTE [DISTWIDTH] IN BLOOD BY AUTOMATED COUNT: 15.2 % (ref 12.3–15.4)
GFR SERPL CREATININE-BSD FRML MDRD: 141 ML/MIN/1.73
GLUCOSE BLD-MCNC: 104 MG/DL (ref 65–99)
HCT VFR BLD AUTO: 38.7 % (ref 34–46.6)
HGB BLD-MCNC: 12.5 G/DL (ref 12–15.9)
IMM GRANULOCYTES # BLD AUTO: 0.03 10*3/MM3 (ref 0–0.05)
IMM GRANULOCYTES NFR BLD AUTO: 0.4 % (ref 0–0.5)
LYMPHOCYTES # BLD AUTO: 0.99 10*3/MM3 (ref 0.7–3.1)
LYMPHOCYTES NFR BLD AUTO: 14.1 % (ref 19.6–45.3)
MCH RBC QN AUTO: 32.2 PG (ref 26.6–33)
MCHC RBC AUTO-ENTMCNC: 32.3 G/DL (ref 31.5–35.7)
MCV RBC AUTO: 99.7 FL (ref 79–97)
MONOCYTES # BLD AUTO: 0.7 10*3/MM3 (ref 0.1–0.9)
MONOCYTES NFR BLD AUTO: 9.9 % (ref 5–12)
NEUTROPHILS # BLD AUTO: 5 10*3/MM3 (ref 1.7–7)
NEUTROPHILS NFR BLD AUTO: 71 % (ref 42.7–76)
NRBC BLD AUTO-RTO: 0 /100 WBC (ref 0–0.2)
PLATELET # BLD AUTO: 270 10*3/MM3 (ref 140–450)
PMV BLD AUTO: 12.3 FL (ref 6–12)
POTASSIUM BLD-SCNC: 3.9 MMOL/L (ref 3.5–5.2)
RBC # BLD AUTO: 3.88 10*6/MM3 (ref 3.77–5.28)
SODIUM BLD-SCNC: 143 MMOL/L (ref 136–145)
WBC NRBC COR # BLD: 7.04 10*3/MM3 (ref 3.4–10.8)

## 2019-09-23 PROCEDURE — 97535 SELF CARE MNGMENT TRAINING: CPT

## 2019-09-23 PROCEDURE — 80048 BASIC METABOLIC PNL TOTAL CA: CPT | Performed by: NURSE PRACTITIONER

## 2019-09-23 PROCEDURE — 85025 COMPLETE CBC W/AUTO DIFF WBC: CPT | Performed by: NURSE PRACTITIONER

## 2019-09-23 PROCEDURE — 97116 GAIT TRAINING THERAPY: CPT

## 2019-09-23 PROCEDURE — 25010000002 ENOXAPARIN PER 10 MG: Performed by: INTERNAL MEDICINE

## 2019-09-23 PROCEDURE — 97110 THERAPEUTIC EXERCISES: CPT

## 2019-09-23 RX ADMIN — ACETAMINOPHEN 650 MG: 325 TABLET, FILM COATED ORAL at 05:34

## 2019-09-23 RX ADMIN — MEGESTROL ACETATE 800 MG: 40 SUSPENSION ORAL at 09:12

## 2019-09-23 RX ADMIN — ENOXAPARIN SODIUM 40 MG: 40 INJECTION SUBCUTANEOUS at 05:34

## 2019-09-23 RX ADMIN — HYDROCHLOROTHIAZIDE 25 MG: 25 TABLET ORAL at 09:11

## 2019-09-23 RX ADMIN — GUAIFENESIN 1200 MG: 600 TABLET, EXTENDED RELEASE ORAL at 09:11

## 2019-09-23 RX ADMIN — ATENOLOL 50 MG: 50 TABLET ORAL at 09:11

## 2019-09-23 RX ADMIN — SODIUM CHLORIDE, PRESERVATIVE FREE 10 ML: 5 INJECTION INTRAVENOUS at 09:16

## 2019-09-24 LAB
BACTERIA SPEC AEROBE CULT: NORMAL
BACTERIA SPEC AEROBE CULT: NORMAL

## 2019-10-14 ENCOUNTER — OFFICE VISIT (OUTPATIENT)
Dept: INTERNAL MEDICINE | Facility: CLINIC | Age: 84
End: 2019-10-14

## 2019-10-14 VITALS
OXYGEN SATURATION: 99 % | SYSTOLIC BLOOD PRESSURE: 168 MMHG | BODY MASS INDEX: 16.86 KG/M2 | TEMPERATURE: 97.5 F | HEIGHT: 65 IN | DIASTOLIC BLOOD PRESSURE: 78 MMHG | HEART RATE: 70 BPM | WEIGHT: 101.2 LBS

## 2019-10-14 DIAGNOSIS — R63.6 UNDERWEIGHT: ICD-10-CM

## 2019-10-14 DIAGNOSIS — I27.20 PULMONARY HYPERTENSION (HCC): ICD-10-CM

## 2019-10-14 DIAGNOSIS — R53.81 DEBILITY: ICD-10-CM

## 2019-10-14 DIAGNOSIS — I26.94 MULTIPLE SUBSEGMENTAL PULMONARY EMBOLI WITHOUT ACUTE COR PULMONALE (HCC): Primary | ICD-10-CM

## 2019-10-14 PROCEDURE — 99214 OFFICE O/P EST MOD 30 MIN: CPT | Performed by: INTERNAL MEDICINE

## 2019-10-14 RX ORDER — APIXABAN 5 MG/1
5 TABLET, FILM COATED ORAL 2 TIMES DAILY
Refills: 0 | COMMUNITY
Start: 2019-10-03 | End: 2019-10-31 | Stop reason: SDUPTHER

## 2019-10-14 RX ORDER — ERGOCALCIFEROL 1.25 MG/1
50000 CAPSULE ORAL WEEKLY
Refills: 2 | COMMUNITY
Start: 2019-10-03

## 2019-10-14 RX ORDER — DOCUSATE SODIUM 100 MG/1
100 CAPSULE, LIQUID FILLED ORAL DAILY
Refills: 0 | COMMUNITY
Start: 2019-10-03

## 2019-10-14 RX ORDER — MEGESTROL ACETATE 40 MG/1
40 TABLET ORAL EVERY MORNING
Refills: 0 | COMMUNITY
Start: 2019-10-03

## 2019-10-14 NOTE — PROGRESS NOTES
Subjective     Chief Complaint   Patient presents with   • Follow-up       History of Present Illness  Pt presents for follow up after hospitalization for multiple pulmonary embolisms. Pt only complaint currently is generalized fatigue since discharge. She does reports some SOA with exertion. Denies CP or abdominal complaints. She denies any abnormal bleeding. Patient has home 02 ans is only using it at night.   Wheezing audible, which sounds fine in quality.   Improved left arm ROM  Right leg lesion improved.   States that she is eating better. Using her boost 2x a day. She has gained 3 pounds since her last visit. Pt is not taking her megace because she says her appetite is fine.       Patient's PMR from outside medical facility reviewed and noted.    Review of Systems   Constitutional: Positive for fatigue. Negative for activity change and unexpected weight change.   HENT: Negative for mouth sores and trouble swallowing.    Eyes: Negative for discharge and visual disturbance.   Respiratory: Negative for cough and shortness of breath.    Cardiovascular: Negative for chest pain and leg swelling.   Gastrointestinal: Negative for abdominal pain, constipation, diarrhea and nausea.   Genitourinary: Negative for decreased urine volume, difficulty urinating and hematuria.   Musculoskeletal: Negative for back pain and gait problem.   Skin: Negative for color change and rash.   Allergic/Immunologic: Negative for environmental allergies and immunocompromised state.   Neurological: Negative for weakness and headaches.   Psychiatric/Behavioral: Negative for confusion and sleep disturbance.        Otherwise complete ROS reviewed and negative except as mentioned in the HPI.    Past Medical History:   Past Medical History:   Diagnosis Date   • Broken arm     left   • Hypertension      Past Surgical History:  Past Surgical History:   Procedure Laterality Date   • CHOLECYSTECTOMY     • FRACTURE SURGERY Left 2019    arm   •  "HYSTERECTOMY       Social History:  reports that she has never smoked. She has never used smokeless tobacco. She reports that she drinks alcohol. She reports that she does not use drugs.    Family History: family history includes Cancer in her sister; Heart attack in her brother; Lung disease in her father.       Allergies:  No Known Allergies  Medications:  Prior to Admission medications    Medication Sig Start Date End Date Taking? Authorizing Provider   atenolol (TENORMIN) 50 MG tablet Take 1 tablet by mouth Daily. 8/13/19  Yes Love Cordon,    docusate sodium (COLACE) 100 MG capsule Take 100 mg by mouth Daily. 10/3/19  Yes Juan Mo MD   ELIQUIS 5 MG tablet tablet Take 5 mg by mouth 2 (Two) Times a Day. 10/3/19  Yes Juan Mo MD   vitamin D (ERGOCALCIFEROL) 07624 units capsule capsule Take 50,000 Units by mouth 1 (One) Time Per Week. 10/3/19  Yes Juan Mo MD   enoxaparin (LOVENOX) 40 MG/0.4ML solution syringe Inject 0.4 mL under the skin into the appropriate area as directed Every 12 (Twelve) Hours. 9/23/19   Breezy Ellis APRN   megestrol (MEGACE) 40 MG tablet Take 40 mg by mouth Every Morning. 10/3/19   Juan Mo MD       Objective     Vital Signs: /78 (BP Location: Right arm, Patient Position: Sitting, Cuff Size: Adult) Comment: just took bp med  Pulse 70   Temp 97.5 °F (36.4 °C) (Oral)   Ht 165.1 cm (65\")   Wt 45.9 kg (101 lb 3.2 oz)   SpO2 99%   Breastfeeding? No   BMI 16.84 kg/m²   Physical Exam   HENT:   Head: Normocephalic and atraumatic.   Nose: Nose normal.   Mouth/Throat: Oropharynx is clear and moist.   Eyes: Conjunctivae and EOM are normal.   Neck: Normal range of motion. Neck supple.   Cardiovascular: Normal rate, regular rhythm and normal heart sounds.   Pulmonary/Chest: Effort normal and breath sounds normal.   Audible wheeze. Lungs are clear.    Abdominal: Soft. Bowel sounds are normal.   Musculoskeletal: She exhibits no " edema or tenderness.   Increased range of motion.    Neurological: She is alert. No cranial nerve deficit.   Skin: Skin is warm and dry.   Helaing right lower extremity wound. Well scabbed with no drainage or erythema.    Psychiatric: She has a normal mood and affect.   Vitals reviewed.      Patient's Body mass index is 16.84 kg/m². BMI is below normal parameters. Recommendations include: treating the underlying disease process.      Results Reviewed:  Glucose   Date Value Ref Range Status   09/23/2019 104 (H) 65 - 99 mg/dL Final     BUN   Date Value Ref Range Status   09/23/2019 12 8 - 23 mg/dL Final     Creatinine   Date Value Ref Range Status   09/23/2019 0.42 (L) 0.57 - 1.00 mg/dL Final     Sodium   Date Value Ref Range Status   09/23/2019 143 136 - 145 mmol/L Final     Potassium   Date Value Ref Range Status   09/23/2019 3.9 3.5 - 5.2 mmol/L Final     Chloride   Date Value Ref Range Status   09/23/2019 104 98 - 107 mmol/L Final     CO2   Date Value Ref Range Status   09/23/2019 31.0 (H) 22.0 - 29.0 mmol/L Final     Calcium   Date Value Ref Range Status   09/23/2019 9.1 8.2 - 9.6 mg/dL Final     ALT (SGPT)   Date Value Ref Range Status   09/20/2019 5 1 - 33 U/L Final     AST (SGOT)   Date Value Ref Range Status   09/20/2019 11 1 - 32 U/L Final     WBC   Date Value Ref Range Status   09/23/2019 7.04 3.40 - 10.80 10*3/mm3 Final     Hematocrit   Date Value Ref Range Status   09/23/2019 38.7 34.0 - 46.6 % Final     Platelets   Date Value Ref Range Status   09/23/2019 270 140 - 450 10*3/mm3 Final     Total Cholesterol   Date Value Ref Range Status   09/20/2019 132 0 - 200 mg/dL Final     Triglycerides   Date Value Ref Range Status   09/20/2019 78 0 - 150 mg/dL Final     HDL Cholesterol   Date Value Ref Range Status   09/20/2019 49 40 - 60 mg/dL Final     LDL Cholesterol    Date Value Ref Range Status   09/20/2019 67 0 - 100 mg/dL Final     LDL/HDL Ratio   Date Value Ref Range Status   09/20/2019 1.38  Final          Assessment / Plan     Assessment/Plan:  1. Multiple subsegmental pulmonary emboli without acute cor pulmonale  - Continue eliquis rx.   - Watch for any sign of bleeding, discussed warning signs with the patient    2. Debility  - Encourage activity    3. Pulmonary hypertension (CMS/HCC)  - May consider Revatio     WHO Functional Class Patient may fall into group 4, will need follow up echo at next visit to review PAH diagnosis    4. Underweight  - Continue to increase calorie intake, including 2 boost drinks per day.         Return in about 3 months (around 1/14/2020) for Recheck. unless patient needs to be seen sooner or acute issues arise.    Code Status: full    I have discussed the patient results/orders and and plan/recommendation with them at today's visit.      Love Cordon, DO   10/14/2019

## 2019-10-16 ENCOUNTER — TELEPHONE (OUTPATIENT)
Dept: INTERNAL MEDICINE | Facility: CLINIC | Age: 84
End: 2019-10-16

## 2019-10-16 NOTE — TELEPHONE ENCOUNTER
Contacted patient to see how she is feeling since her recent office visit. Patient states that she is doing well, just continues to be fatigued. Let patient know that our office is available if she needs anything. Patient states that she has no questions or concerns at this time.

## 2019-10-18 ENCOUNTER — TELEPHONE (OUTPATIENT)
Dept: INTERNAL MEDICINE | Facility: CLINIC | Age: 84
End: 2019-10-18

## 2019-10-18 NOTE — TELEPHONE ENCOUNTER
"Raina with [facility] called to update status of patient. She states that patient does not wish to continue nurse visits, but wishes to continue OT visits. She states that patient is not wearing 02 continuously, only at night. Oxygen spot check 97% RA today. She states that the patient mentioned \"left sided chest pain that started today and is relieved with tylenol. She states that lung sounds are clear and patient is currently without signs or symptoms of distress. Nurse states that she advised patient to promptly go to the ER if chest pain persisted, or if symptoms changed/ increased.  "

## 2019-10-30 ENCOUNTER — TELEPHONE (OUTPATIENT)
Dept: INTERNAL MEDICINE | Facility: CLINIC | Age: 84
End: 2019-10-30

## 2019-10-30 NOTE — TELEPHONE ENCOUNTER
Pt called and wanted to know if Dr. Cordon wants her to continue taking Eliquis.(her blood thinner meds) She has two pills remaining and no refills.  She will call the office tomorrow to discuss as she doesn't really want to take it at all.    She uses [facility]

## 2019-10-31 RX ORDER — APIXABAN 5 MG/1
5 TABLET, FILM COATED ORAL 2 TIMES DAILY
Qty: 60 TABLET | Refills: 0 | Status: SHIPPED | OUTPATIENT
Start: 2019-10-31 | End: 2019-11-04 | Stop reason: SDUPTHER

## 2019-10-31 RX ORDER — APIXABAN 5 MG/1
5 TABLET, FILM COATED ORAL 2 TIMES DAILY
Qty: 60 TABLET | Refills: 6 | Status: CANCELLED | OUTPATIENT
Start: 2019-10-31

## 2019-10-31 NOTE — TELEPHONE ENCOUNTER
Per Dr. Cordon, asked that I call PT and discuss she does NOT want her to stop med prescribed ELIQUIS 5 MG tablet tablet.  She will call in refills.  Pt agreed and promised to take.

## 2019-10-31 NOTE — TELEPHONE ENCOUNTER
Does Mrs Parker need to cont. Eliquis,    She really doesn't want to take them at all. (see prev note).  Please give her a call.

## 2019-11-04 DIAGNOSIS — I26.94 MULTIPLE SUBSEGMENTAL PULMONARY EMBOLI WITHOUT ACUTE COR PULMONALE (HCC): Primary | ICD-10-CM

## 2019-11-04 RX ORDER — APIXABAN 5 MG/1
5 TABLET, FILM COATED ORAL 2 TIMES DAILY
Qty: 60 TABLET | Refills: 5 | Status: SHIPPED | OUTPATIENT
Start: 2019-11-04 | End: 2019-12-05 | Stop reason: SDUPTHER

## 2019-12-05 ENCOUNTER — OFFICE VISIT (OUTPATIENT)
Dept: INTERNAL MEDICINE | Facility: CLINIC | Age: 84
End: 2019-12-05

## 2019-12-05 VITALS
BODY MASS INDEX: 17.43 KG/M2 | OXYGEN SATURATION: 94 % | WEIGHT: 104.6 LBS | HEART RATE: 81 BPM | TEMPERATURE: 97.6 F | HEIGHT: 65 IN | SYSTOLIC BLOOD PRESSURE: 162 MMHG | DIASTOLIC BLOOD PRESSURE: 83 MMHG

## 2019-12-05 DIAGNOSIS — I10 ESSENTIAL HYPERTENSION: ICD-10-CM

## 2019-12-05 DIAGNOSIS — R09.02 HYPOXIA: ICD-10-CM

## 2019-12-05 DIAGNOSIS — I26.94 MULTIPLE SUBSEGMENTAL PULMONARY EMBOLI WITHOUT ACUTE COR PULMONALE (HCC): ICD-10-CM

## 2019-12-05 DIAGNOSIS — E53.8 VITAMIN B 12 DEFICIENCY: Primary | ICD-10-CM

## 2019-12-05 PROCEDURE — 96372 THER/PROPH/DIAG INJ SC/IM: CPT | Performed by: INTERNAL MEDICINE

## 2019-12-05 PROCEDURE — 99213 OFFICE O/P EST LOW 20 MIN: CPT | Performed by: INTERNAL MEDICINE

## 2019-12-05 RX ORDER — CYANOCOBALAMIN 1000 UG/ML
1000 INJECTION, SOLUTION INTRAMUSCULAR; SUBCUTANEOUS
Status: DISCONTINUED | OUTPATIENT
Start: 2019-12-05 | End: 2020-01-01

## 2019-12-05 RX ORDER — APIXABAN 5 MG/1
5 TABLET, FILM COATED ORAL 2 TIMES DAILY
Qty: 60 TABLET | Refills: 5 | Status: SHIPPED | OUTPATIENT
Start: 2019-12-05 | End: 2020-01-01

## 2019-12-05 RX ADMIN — CYANOCOBALAMIN 1000 MCG: 1000 INJECTION, SOLUTION INTRAMUSCULAR; SUBCUTANEOUS at 14:11

## 2019-12-05 NOTE — PROGRESS NOTES
Subjective     Chief Complaint   Patient presents with   • Med Refill     patient is concerned that the eliquis is making her fatigue       History of Present Illness  The patient is a 89 year old white female who presents to clinic complaining of fatigue. She states her energy had been decreased recently. She notes she needs to get better so that she can get back to work in March. Denies any pain associated with fatigue.When walking into the lobby, patient states that she ran out of energy and had to stop to rest. Patient denies becoming SOA.  Patient is concerned a B12 deficiency is causing her fatigue. Historically had B12 deficiency and previously was getting an injection once a month.  Patient has gained three pounds since last visit and reports good appetite. Patient is not taking her appetite stimulant.  States she eats anything she wants and cannot seem to gain enough weight. She expresses concern about easy bruising. Patient states that every time something touches her legs, a bruise pops up. Discussed bruising in relation to blood thinner. Patient desires to stop elequis. Discussed need to take at least 6 months. Discussed history of PE in lung. Patient verbalized understanding and agreed to continue medical therapy.      Patient's PMR from outside medical facility reviewed and noted.    Review of Systems   Constitutional: Positive for activity change and fatigue. Negative for appetite change and chills.   HENT: Negative for rhinorrhea, sinus pain, sneezing and sore throat.    Eyes: Negative for pain, discharge and itching.   Respiratory: Negative for chest tightness, shortness of breath, wheezing and stridor.    Cardiovascular: Negative for chest pain, palpitations and leg swelling.   Gastrointestinal: Negative for abdominal pain, blood in stool, diarrhea, nausea and vomiting.   Genitourinary: Negative for dysuria, frequency, hematuria and urgency.   Skin: Positive for color change.        Easy  "bruising    Neurological: Negative for syncope, speech difficulty and light-headedness.   Hematological: Bruises/bleeds easily.        Otherwise complete ROS reviewed and negative except as mentioned in the HPI.    Past Medical History:   Past Medical History:   Diagnosis Date   • Broken arm     left   • Hypertension      Past Surgical History:  Past Surgical History:   Procedure Laterality Date   • CHOLECYSTECTOMY     • FRACTURE SURGERY Left 2019    arm   • HYSTERECTOMY       Social History:  reports that she has never smoked. She has never used smokeless tobacco. She reports that she drinks alcohol. She reports that she does not use drugs.    Family History: family history includes Cancer in her sister; Heart attack in her brother; Lung disease in her father.       Allergies:  No Known Allergies  Medications:  Prior to Admission medications    Medication Sig Start Date End Date Taking? Authorizing Provider   atenolol (TENORMIN) 50 MG tablet Take 1 tablet by mouth Daily. 8/13/19  Yes Love Cordon DO   ELIQUIS 5 MG tablet tablet Take 1 tablet by mouth 2 (Two) Times a Day. 12/5/19  Yes Love Cordon DO   ELIQUIS 5 MG tablet tablet Take 1 tablet by mouth 2 (Two) Times a Day. 11/4/19 12/5/19 Yes Love Cordon DO   docusate sodium (COLACE) 100 MG capsule Take 100 mg by mouth Daily. 10/3/19   Juan Mo MD   megestrol (MEGACE) 40 MG tablet Take 40 mg by mouth Every Morning. 10/3/19   Juan Mo MD   vitamin D (ERGOCALCIFEROL) 90919 units capsule capsule Take 50,000 Units by mouth 1 (One) Time Per Week. 10/3/19   Juan Mo MD       Objective     Vital Signs: /83 (BP Location: Right arm, Patient Position: Sitting, Cuff Size: Adult)   Pulse 81   Temp 97.6 °F (36.4 °C) (Oral)   Ht 165.1 cm (65\")   Wt 47.4 kg (104 lb 9.6 oz)   SpO2 94%   Breastfeeding? No   BMI 17.41 kg/m²   Physical Exam   Constitutional: She appears well-developed and well-nourished. No " distress.   HENT:   Head: Normocephalic and atraumatic.   Right Ear: External ear normal.   Left Ear: External ear normal.   Eyes: Conjunctivae and EOM are normal. Right eye exhibits no discharge. Left eye exhibits no discharge. No scleral icterus.   Neck: Normal range of motion.   Cardiovascular: Normal rate, regular rhythm and normal heart sounds. Exam reveals no gallop and no friction rub.   No murmur heard.  Pulmonary/Chest: Effort normal. No stridor. No respiratory distress. She has wheezes. She has no rales.   Musculoskeletal:   Wound on medial portion of right distal LE healed. Bruising on UE/LE bilaterally.  Significant dextroscoliosis of thoracic spine       Patient's Body mass index is 17.41 kg/m². BMI is below normal parameters. Recommendations include: diet recommendations.      Results Reviewed:  Glucose   Date Value Ref Range Status   09/23/2019 104 (H) 65 - 99 mg/dL Final     BUN   Date Value Ref Range Status   09/23/2019 12 8 - 23 mg/dL Final     Creatinine   Date Value Ref Range Status   09/23/2019 0.42 (L) 0.57 - 1.00 mg/dL Final     Sodium   Date Value Ref Range Status   09/23/2019 143 136 - 145 mmol/L Final     Potassium   Date Value Ref Range Status   09/23/2019 3.9 3.5 - 5.2 mmol/L Final     Chloride   Date Value Ref Range Status   09/23/2019 104 98 - 107 mmol/L Final     CO2   Date Value Ref Range Status   09/23/2019 31.0 (H) 22.0 - 29.0 mmol/L Final     Calcium   Date Value Ref Range Status   09/23/2019 9.1 8.2 - 9.6 mg/dL Final     ALT (SGPT)   Date Value Ref Range Status   09/20/2019 5 1 - 33 U/L Final     AST (SGOT)   Date Value Ref Range Status   09/20/2019 11 1 - 32 U/L Final     WBC   Date Value Ref Range Status   09/23/2019 7.04 3.40 - 10.80 10*3/mm3 Final     Hematocrit   Date Value Ref Range Status   09/23/2019 38.7 34.0 - 46.6 % Final     Platelets   Date Value Ref Range Status   09/23/2019 270 140 - 450 10*3/mm3 Final     Total Cholesterol   Date Value Ref Range Status   09/20/2019  132 0 - 200 mg/dL Final     Triglycerides   Date Value Ref Range Status   09/20/2019 78 0 - 150 mg/dL Final     HDL Cholesterol   Date Value Ref Range Status   09/20/2019 49 40 - 60 mg/dL Final     LDL Cholesterol    Date Value Ref Range Status   09/20/2019 67 0 - 100 mg/dL Final     LDL/HDL Ratio   Date Value Ref Range Status   09/20/2019 1.38  Final         Assessment / Plan     Assessment/Plan:  1. Multiple subsegmental pulmonary emboli without acute cor pulmonale  - ELIQUIS 5 MG tablet tablet; Take 1 tablet by mouth 2 (Two) Times a Day.  Dispense: 60 tablet; Refill: 5    2. Vitamin B 12 deficiency  - cyanocobalamin injection 1,000 mcg    3. Hypoxia  - Ambulatory O2 assessment in office  - Patient states that she will not wear 02 during the day. States that it is tacky. Does have home 02 for nocturnal use.    4. Essential hypertension  - Atenolol. BP remains high. Patient is resistant to BP med changes.         Return in about 3 months (around 3/5/2020) for Next scheduled follow up. unless patient needs to be seen sooner or acute issues arise.    Code Status: Full    I have discussed the patient results/orders and and plan/recommendation with them at today's visit.      Love Cordon, DO   12/05/2019

## 2020-01-01 ENCOUNTER — HOSPITAL ENCOUNTER (OUTPATIENT)
Dept: CT IMAGING | Facility: HOSPITAL | Age: 85
Discharge: HOME | End: 2020-12-11

## 2020-01-01 ENCOUNTER — TRANSCRIBE ORDERS (OUTPATIENT)
Dept: ADMINISTRATIVE | Facility: HOSPITAL | Age: 85
End: 2020-01-01

## 2020-01-01 ENCOUNTER — DOCUMENTATION (OUTPATIENT)
Dept: RADIATION ONCOLOGY | Facility: HOSPITAL | Age: 85
End: 2020-01-01

## 2020-01-01 ENCOUNTER — OFFICE VISIT (OUTPATIENT)
Dept: INTERNAL MEDICINE | Facility: CLINIC | Age: 85
End: 2020-01-01

## 2020-01-01 ENCOUNTER — CONSULT (OUTPATIENT)
Dept: RADIATION ONCOLOGY | Facility: HOSPITAL | Age: 85
End: 2020-01-01

## 2020-01-01 ENCOUNTER — TELEPHONE (OUTPATIENT)
Dept: RADIATION ONCOLOGY | Facility: HOSPITAL | Age: 85
End: 2020-01-01

## 2020-01-01 ENCOUNTER — HOSPITAL ENCOUNTER (OUTPATIENT)
Dept: CT IMAGING | Facility: HOSPITAL | Age: 85
End: 2020-01-01

## 2020-01-01 ENCOUNTER — TELEPHONE (OUTPATIENT)
Dept: INTERNAL MEDICINE | Facility: CLINIC | Age: 85
End: 2020-01-01

## 2020-01-01 ENCOUNTER — APPOINTMENT (OUTPATIENT)
Dept: GENERAL RADIOLOGY | Facility: HOSPITAL | Age: 85
End: 2020-01-01

## 2020-01-01 ENCOUNTER — LAB (OUTPATIENT)
Dept: LAB | Facility: HOSPITAL | Age: 85
End: 2020-01-01

## 2020-01-01 ENCOUNTER — HOSPITAL ENCOUNTER (OUTPATIENT)
Dept: MRI IMAGING | Facility: HOSPITAL | Age: 85
Discharge: HOME | End: 2020-12-01

## 2020-01-01 ENCOUNTER — HOSPITAL ENCOUNTER (OUTPATIENT)
Dept: NUTRITION | Facility: HOSPITAL | Age: 85
Discharge: HOME | End: 2020-12-03

## 2020-01-01 ENCOUNTER — DOCUMENTATION (OUTPATIENT)
Dept: INTERNAL MEDICINE | Facility: CLINIC | Age: 85
End: 2020-01-01

## 2020-01-01 ENCOUNTER — HOSPITAL ENCOUNTER (INPATIENT)
Facility: HOSPITAL | Age: 85
LOS: 3 days | Discharge: HOME HEALTH | End: 2020-12-17
Attending: EMERGENCY MEDICINE | Admitting: FAMILY MEDICINE

## 2020-01-01 ENCOUNTER — APPOINTMENT (OUTPATIENT)
Dept: CT IMAGING | Facility: HOSPITAL | Age: 85
End: 2020-01-01

## 2020-01-01 ENCOUNTER — APPOINTMENT (OUTPATIENT)
Dept: RADIATION ONCOLOGY | Facility: HOSPITAL | Age: 85
End: 2020-01-01

## 2020-01-01 ENCOUNTER — HOSPITAL ENCOUNTER (OUTPATIENT)
Dept: RADIATION ONCOLOGY | Facility: HOSPITAL | Age: 85
Setting detail: RADIATION/ONCOLOGY SERIES
Discharge: HOME | End: 2020-12-01

## 2020-01-01 ENCOUNTER — TRANSITIONAL CARE MANAGEMENT TELEPHONE ENCOUNTER (OUTPATIENT)
Dept: CALL CENTER | Facility: HOSPITAL | Age: 85
End: 2020-01-01

## 2020-01-01 ENCOUNTER — READMISSION MANAGEMENT (OUTPATIENT)
Dept: CALL CENTER | Facility: HOSPITAL | Age: 85
End: 2020-01-01

## 2020-01-01 ENCOUNTER — CLINICAL SUPPORT (OUTPATIENT)
Dept: INTERNAL MEDICINE | Facility: CLINIC | Age: 85
End: 2020-01-01

## 2020-01-01 ENCOUNTER — HOSPITAL ENCOUNTER (OUTPATIENT)
Dept: RADIATION ONCOLOGY | Facility: HOSPITAL | Age: 85
Setting detail: RADIATION/ONCOLOGY SERIES
End: 2020-01-01

## 2020-01-01 VITALS
HEIGHT: 65 IN | TEMPERATURE: 98.1 F | DIASTOLIC BLOOD PRESSURE: 84 MMHG | SYSTOLIC BLOOD PRESSURE: 159 MMHG | BODY MASS INDEX: 16.56 KG/M2 | OXYGEN SATURATION: 98 % | RESPIRATION RATE: 16 BRPM | WEIGHT: 99.4 LBS | HEART RATE: 102 BPM

## 2020-01-01 VITALS
BODY MASS INDEX: 17 KG/M2 | HEIGHT: 65 IN | TEMPERATURE: 97.7 F | WEIGHT: 102 LBS | HEART RATE: 86 BPM | SYSTOLIC BLOOD PRESSURE: 193 MMHG | DIASTOLIC BLOOD PRESSURE: 80 MMHG | OXYGEN SATURATION: 95 %

## 2020-01-01 VITALS
HEART RATE: 112 BPM | DIASTOLIC BLOOD PRESSURE: 67 MMHG | OXYGEN SATURATION: 94 % | HEIGHT: 65 IN | WEIGHT: 102 LBS | TEMPERATURE: 97.7 F | SYSTOLIC BLOOD PRESSURE: 177 MMHG | RESPIRATION RATE: 18 BRPM | BODY MASS INDEX: 17 KG/M2

## 2020-01-01 VITALS
DIASTOLIC BLOOD PRESSURE: 91 MMHG | HEART RATE: 82 BPM | SYSTOLIC BLOOD PRESSURE: 163 MMHG | OXYGEN SATURATION: 95 % | TEMPERATURE: 97.7 F | WEIGHT: 100 LBS | BODY MASS INDEX: 16.66 KG/M2 | HEIGHT: 65 IN

## 2020-01-01 VITALS
TEMPERATURE: 98.3 F | HEART RATE: 79 BPM | BODY MASS INDEX: 17 KG/M2 | DIASTOLIC BLOOD PRESSURE: 76 MMHG | SYSTOLIC BLOOD PRESSURE: 181 MMHG | HEIGHT: 65 IN | OXYGEN SATURATION: 95 % | WEIGHT: 102 LBS

## 2020-01-01 VITALS
SYSTOLIC BLOOD PRESSURE: 161 MMHG | HEART RATE: 70 BPM | HEIGHT: 65 IN | WEIGHT: 90 LBS | TEMPERATURE: 98.3 F | DIASTOLIC BLOOD PRESSURE: 72 MMHG | OXYGEN SATURATION: 92 % | RESPIRATION RATE: 16 BRPM | BODY MASS INDEX: 14.99 KG/M2

## 2020-01-01 VITALS
TEMPERATURE: 98.2 F | SYSTOLIC BLOOD PRESSURE: 188 MMHG | DIASTOLIC BLOOD PRESSURE: 89 MMHG | OXYGEN SATURATION: 94 % | WEIGHT: 101.5 LBS | BODY MASS INDEX: 16.91 KG/M2 | HEART RATE: 87 BPM | HEIGHT: 65 IN

## 2020-01-01 VITALS
SYSTOLIC BLOOD PRESSURE: 204 MMHG | DIASTOLIC BLOOD PRESSURE: 91 MMHG | OXYGEN SATURATION: 92 % | HEART RATE: 84 BPM | TEMPERATURE: 98.7 F | BODY MASS INDEX: 16.83 KG/M2 | HEIGHT: 65 IN | WEIGHT: 101 LBS

## 2020-01-01 VITALS
OXYGEN SATURATION: 95 % | SYSTOLIC BLOOD PRESSURE: 164 MMHG | HEART RATE: 80 BPM | DIASTOLIC BLOOD PRESSURE: 77 MMHG | BODY MASS INDEX: 17.23 KG/M2 | TEMPERATURE: 98.2 F | WEIGHT: 103.4 LBS | HEIGHT: 65 IN

## 2020-01-01 VITALS
OXYGEN SATURATION: 95 % | DIASTOLIC BLOOD PRESSURE: 85 MMHG | TEMPERATURE: 97.6 F | BODY MASS INDEX: 16.83 KG/M2 | HEIGHT: 65 IN | WEIGHT: 101 LBS | SYSTOLIC BLOOD PRESSURE: 183 MMHG | HEART RATE: 80 BPM

## 2020-01-01 VITALS
HEIGHT: 65 IN | OXYGEN SATURATION: 95 % | BODY MASS INDEX: 16.66 KG/M2 | HEART RATE: 71 BPM | TEMPERATURE: 97 F | SYSTOLIC BLOOD PRESSURE: 186 MMHG | WEIGHT: 100 LBS | DIASTOLIC BLOOD PRESSURE: 92 MMHG

## 2020-01-01 VITALS
BODY MASS INDEX: 15.98 KG/M2 | HEIGHT: 65 IN | WEIGHT: 95.9 LBS | DIASTOLIC BLOOD PRESSURE: 68 MMHG | SYSTOLIC BLOOD PRESSURE: 160 MMHG

## 2020-01-01 VITALS — HEIGHT: 65 IN | WEIGHT: 90 LBS | BODY MASS INDEX: 14.99 KG/M2

## 2020-01-01 DIAGNOSIS — D75.1 POLYCYTHEMIA: ICD-10-CM

## 2020-01-01 DIAGNOSIS — C79.51 SPINE METASTASIS: ICD-10-CM

## 2020-01-01 DIAGNOSIS — R53.83 FATIGUE, UNSPECIFIED TYPE: Primary | ICD-10-CM

## 2020-01-01 DIAGNOSIS — R53.81 DEBILITY: Primary | ICD-10-CM

## 2020-01-01 DIAGNOSIS — Z74.09 IMPAIRED MOBILITY AND ADLS: ICD-10-CM

## 2020-01-01 DIAGNOSIS — C79.31 BRAIN METASTASES: ICD-10-CM

## 2020-01-01 DIAGNOSIS — R53.83 FATIGUE, UNSPECIFIED TYPE: ICD-10-CM

## 2020-01-01 DIAGNOSIS — C34.90 PRIMARY MALIGNANT NEOPLASM OF LUNG, UNSPECIFIED LATERALITY (HCC): ICD-10-CM

## 2020-01-01 DIAGNOSIS — R62.7 FAILURE TO THRIVE IN ADULT: Primary | ICD-10-CM

## 2020-01-01 DIAGNOSIS — R54 AGE-RELATED PHYSICAL DEBILITY: ICD-10-CM

## 2020-01-01 DIAGNOSIS — R91.8 LUNG MASS: Primary | ICD-10-CM

## 2020-01-01 DIAGNOSIS — I10 ESSENTIAL HYPERTENSION: ICD-10-CM

## 2020-01-01 DIAGNOSIS — M53.3 SACRAL MASS: Primary | ICD-10-CM

## 2020-01-01 DIAGNOSIS — K59.00 CONSTIPATION, UNSPECIFIED CONSTIPATION TYPE: ICD-10-CM

## 2020-01-01 DIAGNOSIS — Z74.09 IMMOBILITY: ICD-10-CM

## 2020-01-01 DIAGNOSIS — G89.3 CANCER ASSOCIATED PAIN: Primary | ICD-10-CM

## 2020-01-01 DIAGNOSIS — R29.898 WEAKNESS OF LEFT UPPER EXTREMITY: Primary | ICD-10-CM

## 2020-01-01 DIAGNOSIS — M53.3 SACRAL MASS: ICD-10-CM

## 2020-01-01 DIAGNOSIS — N30.00 ACUTE CYSTITIS WITHOUT HEMATURIA: ICD-10-CM

## 2020-01-01 DIAGNOSIS — I27.82 CHRONIC PULMONARY EMBOLISM, UNSPECIFIED PULMONARY EMBOLISM TYPE, UNSPECIFIED WHETHER ACUTE COR PULMONALE PRESENT (HCC): Primary | ICD-10-CM

## 2020-01-01 DIAGNOSIS — H21.561 PUPILLARY ABNORMALITY OF RIGHT EYE: ICD-10-CM

## 2020-01-01 DIAGNOSIS — R63.4 WEIGHT LOSS: ICD-10-CM

## 2020-01-01 DIAGNOSIS — L03.90 WOUND CELLULITIS: Primary | ICD-10-CM

## 2020-01-01 DIAGNOSIS — R91.8 MASS OF UPPER LOBE OF RIGHT LUNG: Primary | ICD-10-CM

## 2020-01-01 DIAGNOSIS — I27.82 CHRONIC PULMONARY EMBOLISM, UNSPECIFIED PULMONARY EMBOLISM TYPE, UNSPECIFIED WHETHER ACUTE COR PULMONALE PRESENT (HCC): ICD-10-CM

## 2020-01-01 DIAGNOSIS — Z78.9 IMPAIRED MOBILITY AND ADLS: ICD-10-CM

## 2020-01-01 DIAGNOSIS — Z23 ENCOUNTER FOR IMMUNIZATION: ICD-10-CM

## 2020-01-01 DIAGNOSIS — G93.89 BRAIN MASS: ICD-10-CM

## 2020-01-01 DIAGNOSIS — R91.8 MASS OF UPPER LOBE OF RIGHT LUNG: ICD-10-CM

## 2020-01-01 DIAGNOSIS — R63.4 WEIGHT LOSS: Primary | ICD-10-CM

## 2020-01-01 DIAGNOSIS — D72.829 LEUKOCYTOSIS, UNSPECIFIED TYPE: ICD-10-CM

## 2020-01-01 DIAGNOSIS — Z78.9 NON-SMOKER: ICD-10-CM

## 2020-01-01 DIAGNOSIS — R63.6 UNDERWEIGHT: ICD-10-CM

## 2020-01-01 DIAGNOSIS — M25.572 ACUTE LEFT ANKLE PAIN: Primary | ICD-10-CM

## 2020-01-01 DIAGNOSIS — E55.9 VITAMIN D DEFICIENCY: ICD-10-CM

## 2020-01-01 DIAGNOSIS — S70.12XA HEMATOMA OF LEFT THIGH, INITIAL ENCOUNTER: Primary | ICD-10-CM

## 2020-01-01 DIAGNOSIS — E43 UNSPECIFIED SEVERE PROTEIN-CALORIE MALNUTRITION (HCC): ICD-10-CM

## 2020-01-01 DIAGNOSIS — Z01.818 PREOP TESTING: Primary | ICD-10-CM

## 2020-01-01 DIAGNOSIS — M89.9 LYTIC LESION OF BONE ON X-RAY: Primary | ICD-10-CM

## 2020-01-01 DIAGNOSIS — R53.1 WEAKNESS: ICD-10-CM

## 2020-01-01 DIAGNOSIS — T14.8XXA HEMATOMA: Primary | ICD-10-CM

## 2020-01-01 DIAGNOSIS — L98.9 SKIN LESION: Primary | ICD-10-CM

## 2020-01-01 DIAGNOSIS — R53.1 GENERALIZED WEAKNESS: ICD-10-CM

## 2020-01-01 LAB
25(OH)D3+25(OH)D2 SERPL-MCNC: 24.4 NG/ML (ref 30–100)
ALBUMIN SERPL-MCNC: 3.3 G/DL (ref 3.5–5.2)
ALBUMIN SERPL-MCNC: 3.3 G/DL (ref 3.5–5.2)
ALBUMIN SERPL-MCNC: 4.1 G/DL (ref 3.5–4.6)
ALBUMIN SERPL-MCNC: 4.4 G/DL (ref 3.5–4.6)
ALBUMIN/GLOB SERPL: 1.5 G/DL
ALBUMIN/GLOB SERPL: 1.6 G/DL
ALBUMIN/GLOB SERPL: 1.7 {RATIO} (ref 1.2–2.2)
ALBUMIN/GLOB SERPL: 1.7 {RATIO} (ref 1.2–2.2)
ALP SERPL-CCNC: 121 U/L (ref 39–117)
ALP SERPL-CCNC: 126 U/L (ref 39–117)
ALP SERPL-CCNC: 72 IU/L (ref 39–117)
ALP SERPL-CCNC: 73 IU/L (ref 39–117)
ALT SERPL W P-5'-P-CCNC: 66 U/L (ref 1–33)
ALT SERPL W P-5'-P-CCNC: 70 U/L (ref 1–33)
ALT SERPL-CCNC: 11 IU/L (ref 0–32)
ALT SERPL-CCNC: 12 IU/L (ref 0–32)
ANION GAP SERPL CALCULATED.3IONS-SCNC: 10 MMOL/L (ref 5–15)
ANION GAP SERPL CALCULATED.3IONS-SCNC: 7 MMOL/L (ref 5–15)
ANISOCYTOSIS BLD QL: ABNORMAL
APTT PPP: 25.7 SECONDS (ref 24.1–35)
AST SERPL-CCNC: 16 IU/L (ref 0–40)
AST SERPL-CCNC: 17 IU/L (ref 0–40)
AST SERPL-CCNC: 25 U/L (ref 1–32)
AST SERPL-CCNC: 28 U/L (ref 1–32)
BASOPHILS # BLD AUTO: 0.1 X10E3/UL (ref 0–0.2)
BASOPHILS NFR BLD AUTO: 1 %
BASOPHILS NFR BLD AUTO: 1 %
BASOPHILS NFR BLD AUTO: 2 %
BILIRUB BLD-MCNC: NEGATIVE MG/DL
BILIRUB BLD-MCNC: NEGATIVE MG/DL
BILIRUB SERPL-MCNC: 0.5 MG/DL (ref 0–1.2)
BILIRUB SERPL-MCNC: 0.6 MG/DL (ref 0–1.2)
BILIRUB SERPL-MCNC: 0.8 MG/DL (ref 0–1.2)
BILIRUB SERPL-MCNC: 1.1 MG/DL (ref 0–1.2)
BILIRUB UR QL STRIP: NEGATIVE
BUN SERPL-MCNC: 11 MG/DL (ref 10–36)
BUN SERPL-MCNC: 15 MG/DL (ref 10–36)
BUN SERPL-MCNC: 27 MG/DL (ref 8–23)
BUN SERPL-MCNC: 27 MG/DL (ref 8–23)
BUN/CREAT SERPL: 17 (ref 12–28)
BUN/CREAT SERPL: 21 (ref 12–28)
BUN/CREAT SERPL: 75 (ref 7–25)
BUN/CREAT SERPL: 75 (ref 7–25)
CALCIUM SERPL-MCNC: 9.7 MG/DL (ref 8.7–10.3)
CALCIUM SERPL-MCNC: 9.9 MG/DL (ref 8.7–10.3)
CALCIUM SPEC-SCNC: 8.6 MG/DL (ref 8.2–9.6)
CALCIUM SPEC-SCNC: 8.6 MG/DL (ref 8.2–9.6)
CHLORIDE SERPL-SCNC: 102 MMOL/L (ref 96–106)
CHLORIDE SERPL-SCNC: 103 MMOL/L (ref 98–107)
CHLORIDE SERPL-SCNC: 105 MMOL/L (ref 98–107)
CHLORIDE SERPL-SCNC: 106 MMOL/L (ref 96–106)
CK SERPL-CCNC: 28 U/L (ref 20–180)
CLARITY UR: CLEAR
CLARITY, POC: CLEAR
CLARITY, POC: CLEAR
CO2 SERPL-SCNC: 24 MMOL/L (ref 20–29)
CO2 SERPL-SCNC: 27 MMOL/L (ref 22–29)
CO2 SERPL-SCNC: 30 MMOL/L (ref 20–29)
CO2 SERPL-SCNC: 30 MMOL/L (ref 22–29)
COLOR UR: YELLOW
COVID LABCORP PRIORITY: NORMAL
CREAT BLDA-MCNC: 0.8 MG/DL (ref 0.6–1.3)
CREAT SERPL-MCNC: 0.36 MG/DL (ref 0.57–1)
CREAT SERPL-MCNC: 0.36 MG/DL (ref 0.57–1)
CREAT SERPL-MCNC: 0.63 MG/DL (ref 0.57–1)
CREAT SERPL-MCNC: 0.73 MG/DL (ref 0.57–1)
CYTO UR: NORMAL
DEPRECATED RDW RBC AUTO: 59.5 FL (ref 37–54)
DEPRECATED RDW RBC AUTO: 59.9 FL (ref 37–54)
EOSINOPHIL # BLD AUTO: 0.2 X10E3/UL (ref 0–0.4)
EOSINOPHIL # BLD AUTO: 0.4 X10E3/UL (ref 0–0.4)
EOSINOPHIL # BLD AUTO: 0.4 X10E3/UL (ref 0–0.4)
EOSINOPHIL # BLD MANUAL: 0.32 10*3/MM3 (ref 0–0.4)
EOSINOPHIL NFR BLD AUTO: 2 %
EOSINOPHIL NFR BLD AUTO: 4 %
EOSINOPHIL NFR BLD AUTO: 6 %
EOSINOPHIL NFR BLD MANUAL: 2 % (ref 0.3–6.2)
ERYTHROCYTE [DISTWIDTH] IN BLOOD BY AUTOMATED COUNT: 14.9 % (ref 11.7–15.4)
ERYTHROCYTE [DISTWIDTH] IN BLOOD BY AUTOMATED COUNT: 15 % (ref 11.7–15.4)
ERYTHROCYTE [DISTWIDTH] IN BLOOD BY AUTOMATED COUNT: 15.9 % (ref 11.7–15.4)
ERYTHROCYTE [DISTWIDTH] IN BLOOD BY AUTOMATED COUNT: 17.8 % (ref 12.3–15.4)
ERYTHROCYTE [DISTWIDTH] IN BLOOD BY AUTOMATED COUNT: 17.9 % (ref 12.3–15.4)
GFR SERPL CREATININE-BSD FRML MDRD: >150 ML/MIN/1.73
GFR SERPL CREATININE-BSD FRML MDRD: >150 ML/MIN/1.73
GLOBULIN SER CALC-MCNC: 2.4 G/DL (ref 1.5–4.5)
GLOBULIN SER CALC-MCNC: 2.6 G/DL (ref 1.5–4.5)
GLOBULIN UR ELPH-MCNC: 2.1 GM/DL
GLOBULIN UR ELPH-MCNC: 2.2 GM/DL
GLUCOSE SERPL-MCNC: 108 MG/DL (ref 65–99)
GLUCOSE SERPL-MCNC: 127 MG/DL (ref 65–99)
GLUCOSE SERPL-MCNC: 156 MG/DL (ref 65–99)
GLUCOSE SERPL-MCNC: 89 MG/DL (ref 65–99)
GLUCOSE UR STRIP-MCNC: ABNORMAL MG/DL
GLUCOSE UR STRIP-MCNC: NEGATIVE MG/DL
GLUCOSE UR STRIP-MCNC: NEGATIVE MG/DL
HCT VFR BLD AUTO: 44.9 % (ref 34–46.6)
HCT VFR BLD AUTO: 47 % (ref 34–46.6)
HCT VFR BLD AUTO: 50.1 % (ref 34–46.6)
HCT VFR BLD AUTO: 57.1 % (ref 34–46.6)
HCT VFR BLD AUTO: 60.3 % (ref 34–46.6)
HGB BLD-MCNC: 15.2 G/DL (ref 11.1–15.9)
HGB BLD-MCNC: 15.3 G/DL (ref 11.1–15.9)
HGB BLD-MCNC: 16.8 G/DL (ref 11.1–15.9)
HGB BLD-MCNC: 18.7 G/DL (ref 12–15.9)
HGB BLD-MCNC: 20.3 G/DL (ref 12–15.9)
HGB UR QL STRIP.AUTO: NEGATIVE
IMM GRANULOCYTES # BLD AUTO: 0 X10E3/UL (ref 0–0.1)
IMM GRANULOCYTES NFR BLD AUTO: 0 %
INR PPP: 1.06 (ref 0.91–1.09)
IRON 24H UR-MRATE: 64 MCG/DL (ref 37–145)
IRON SATN MFR SERPL: 24 % (ref 20–50)
KETONES UR QL STRIP: NEGATIVE
KETONES UR QL: NEGATIVE
KETONES UR QL: NEGATIVE
LAB AP CASE REPORT: NORMAL
LAB AP CLINICAL INFORMATION: NORMAL
LEUKOCYTE EST, POC: NEGATIVE
LEUKOCYTE EST, POC: NEGATIVE
LEUKOCYTE ESTERASE UR QL STRIP.AUTO: NEGATIVE
LYMPHOCYTES # BLD AUTO: 0.8 X10E3/UL (ref 0.7–3.1)
LYMPHOCYTES # BLD AUTO: 0.8 X10E3/UL (ref 0.7–3.1)
LYMPHOCYTES # BLD AUTO: 1.3 X10E3/UL (ref 0.7–3.1)
LYMPHOCYTES # BLD MANUAL: 0.49 10*3/MM3 (ref 0.7–3.1)
LYMPHOCYTES NFR BLD AUTO: 12 %
LYMPHOCYTES NFR BLD AUTO: 16 %
LYMPHOCYTES NFR BLD AUTO: 9 %
LYMPHOCYTES NFR BLD MANUAL: 14 % (ref 5–12)
LYMPHOCYTES NFR BLD MANUAL: 3 % (ref 19.6–45.3)
Lab: NORMAL
MCH RBC QN AUTO: 32.2 PG (ref 26.6–33)
MCH RBC QN AUTO: 32.6 PG (ref 26.6–33)
MCH RBC QN AUTO: 32.9 PG (ref 26.6–33)
MCH RBC QN AUTO: 33.4 PG (ref 26.6–33)
MCH RBC QN AUTO: 33.7 PG (ref 26.6–33)
MCHC RBC AUTO-ENTMCNC: 32.3 G/DL (ref 31.5–35.7)
MCHC RBC AUTO-ENTMCNC: 32.7 G/DL (ref 31.5–35.7)
MCHC RBC AUTO-ENTMCNC: 33.5 G/DL (ref 31.5–35.7)
MCHC RBC AUTO-ENTMCNC: 33.7 G/DL (ref 31.5–35.7)
MCHC RBC AUTO-ENTMCNC: 34.1 G/DL (ref 31.5–35.7)
MCV RBC AUTO: 100 FL (ref 79–97)
MCV RBC AUTO: 101 FL (ref 79–97)
MCV RBC AUTO: 97.7 FL (ref 79–97)
MCV RBC AUTO: 98 FL (ref 79–97)
MCV RBC AUTO: 99.5 FL (ref 79–97)
METAMYELOCYTES NFR BLD MANUAL: 1 % (ref 0–0)
MONOCYTES # BLD AUTO: 0.4 X10E3/UL (ref 0.1–0.9)
MONOCYTES # BLD AUTO: 0.8 X10E3/UL (ref 0.1–0.9)
MONOCYTES # BLD AUTO: 0.8 X10E3/UL (ref 0.1–0.9)
MONOCYTES # BLD AUTO: 2.27 10*3/MM3 (ref 0.1–0.9)
MONOCYTES NFR BLD AUTO: 10 %
MONOCYTES NFR BLD AUTO: 6 %
MONOCYTES NFR BLD AUTO: 9 %
NEUTROPHILS # BLD AUTO: 12.66 10*3/MM3 (ref 1.7–7)
NEUTROPHILS # BLD AUTO: 5.4 X10E3/UL (ref 1.4–7)
NEUTROPHILS # BLD AUTO: 5.7 X10E3/UL (ref 1.4–7)
NEUTROPHILS # BLD AUTO: 6.9 X10E3/UL (ref 1.4–7)
NEUTROPHILS NFR BLD AUTO: 69 %
NEUTROPHILS NFR BLD AUTO: 74 %
NEUTROPHILS NFR BLD AUTO: 79 %
NEUTROPHILS NFR BLD MANUAL: 77 % (ref 42.7–76)
NEUTS BAND NFR BLD MANUAL: 1 % (ref 0–5)
NITRITE UR QL STRIP: NEGATIVE
NITRITE UR-MCNC: NEGATIVE MG/ML
NITRITE UR-MCNC: NEGATIVE MG/ML
PATH REPORT.FINAL DX SPEC: NORMAL
PATH REPORT.GROSS SPEC: NORMAL
PH UR STRIP.AUTO: 6.5 [PH] (ref 5–8)
PH UR: 5.5 [PH] (ref 5–8)
PH UR: 6 [PH] (ref 5–8)
PLAT MORPH BLD: NORMAL
PLATELET # BLD AUTO: 170 10*3/MM3 (ref 140–450)
PLATELET # BLD AUTO: 173 10*3/MM3 (ref 140–450)
PLATELET # BLD AUTO: 218 10*3/MM3 (ref 140–450)
PLATELET # BLD AUTO: 427 X10E3/UL (ref 150–450)
PLATELET # BLD AUTO: 441 X10E3/UL (ref 150–450)
PLATELET # BLD AUTO: 510 X10E3/UL (ref 150–450)
PMV BLD AUTO: 12.7 FL (ref 6–12)
PMV BLD AUTO: 12.8 FL (ref 6–12)
POIKILOCYTOSIS BLD QL SMEAR: ABNORMAL
POLYCHROMASIA BLD QL SMEAR: ABNORMAL
POTASSIUM SERPL-SCNC: 4.6 MMOL/L (ref 3.5–5.2)
POTASSIUM SERPL-SCNC: 4.7 MMOL/L (ref 3.5–5.2)
POTASSIUM SERPL-SCNC: 4.8 MMOL/L (ref 3.5–5.2)
POTASSIUM SERPL-SCNC: 5.2 MMOL/L (ref 3.5–5.2)
PROT SERPL-MCNC: 5.4 G/DL (ref 6–8.5)
PROT SERPL-MCNC: 5.5 G/DL (ref 6–8.5)
PROT SERPL-MCNC: 6.5 G/DL (ref 6–8.5)
PROT SERPL-MCNC: 7 G/DL (ref 6–8.5)
PROT UR QL STRIP: NEGATIVE
PROT UR STRIP-MCNC: NEGATIVE MG/DL
PROT UR STRIP-MCNC: NEGATIVE MG/DL
PROTHROMBIN TIME: 13.4 SECONDS (ref 11.9–14.6)
RBC # BLD AUTO: 4.58 X10E6/UL (ref 3.77–5.28)
RBC # BLD AUTO: 4.72 X10E6/UL (ref 3.77–5.28)
RBC # BLD AUTO: 4.98 X10E6/UL (ref 3.77–5.28)
RBC # BLD AUTO: 5.74 10*6/MM3 (ref 3.77–5.28)
RBC # BLD AUTO: 6.17 10*6/MM3 (ref 3.77–5.28)
RBC # UR STRIP: NEGATIVE /UL
RBC # UR STRIP: NEGATIVE /UL
SARS-COV-2 RNA RESP QL NAA+PROBE: NOT DETECTED
SARS-COV-2 RNA RESP QL NAA+PROBE: NOT DETECTED
SODIUM SERPL-SCNC: 140 MMOL/L (ref 136–145)
SODIUM SERPL-SCNC: 142 MMOL/L (ref 136–145)
SODIUM SERPL-SCNC: 145 MMOL/L (ref 134–144)
SODIUM SERPL-SCNC: 147 MMOL/L (ref 134–144)
SP GR UR STRIP: 1.02 (ref 1–1.03)
SP GR UR: 1.02 (ref 1–1.03)
SP GR UR: 1.03 (ref 1–1.03)
TIBC SERPL-MCNC: 264 MCG/DL (ref 298–536)
TRANSFERRIN SERPL-MCNC: 177 MG/DL (ref 200–360)
TSH SERPL DL<=0.005 MIU/L-ACNC: 2.08 UIU/ML (ref 0.45–4.5)
TSH SERPL DL<=0.05 MIU/L-ACNC: 0.66 UIU/ML (ref 0.27–4.2)
UROBILINOGEN UR QL STRIP: ABNORMAL
UROBILINOGEN UR QL: NORMAL
UROBILINOGEN UR QL: NORMAL
VARIANT LYMPHS NFR BLD MANUAL: 2 % (ref 0–5)
WBC # BLD AUTO: 14.48 10*3/MM3 (ref 3.4–10.8)
WBC # BLD AUTO: 16.23 10*3/MM3 (ref 3.4–10.8)
WBC # BLD AUTO: 7.1 X10E3/UL (ref 3.4–10.8)
WBC # BLD AUTO: 8.3 X10E3/UL (ref 3.4–10.8)
WBC # BLD AUTO: 8.9 X10E3/UL (ref 3.4–10.8)
WBC MORPH BLD: NORMAL

## 2020-01-01 PROCEDURE — U0003 INFECTIOUS AGENT DETECTION BY NUCLEIC ACID (DNA OR RNA); SEVERE ACUTE RESPIRATORY SYNDROME CORONAVIRUS 2 (SARS-COV-2) (CORONAVIRUS DISEASE [COVID-19]), AMPLIFIED PROBE TECHNIQUE, MAKING USE OF HIGH THROUGHPUT TECHNOLOGIES AS DESCRIBED BY CMS-2020-01-R: HCPCS | Performed by: EMERGENCY MEDICINE

## 2020-01-01 PROCEDURE — 88305 TISSUE EXAM BY PATHOLOGIST: CPT | Performed by: PHYSICIAN ASSISTANT

## 2020-01-01 PROCEDURE — 88342 IMHCHEM/IMCYTCHM 1ST ANTB: CPT | Performed by: PHYSICIAN ASSISTANT

## 2020-01-01 PROCEDURE — 77412 RADIATION TX DELIVERY LVL 3: CPT | Performed by: RADIOLOGY

## 2020-01-01 PROCEDURE — P9612 CATHETERIZE FOR URINE SPEC: HCPCS

## 2020-01-01 PROCEDURE — 77012 CT SCAN FOR NEEDLE BIOPSY: CPT

## 2020-01-01 PROCEDURE — 99213 OFFICE O/P EST LOW 20 MIN: CPT | Performed by: INTERNAL MEDICINE

## 2020-01-01 PROCEDURE — G0008 ADMIN INFLUENZA VIRUS VAC: HCPCS | Performed by: INTERNAL MEDICINE

## 2020-01-01 PROCEDURE — 85027 COMPLETE CBC AUTOMATED: CPT | Performed by: INTERNAL MEDICINE

## 2020-01-01 PROCEDURE — 77334 RADIATION TREATMENT AID(S): CPT | Performed by: RADIOLOGY

## 2020-01-01 PROCEDURE — 99222 1ST HOSP IP/OBS MODERATE 55: CPT | Performed by: INTERNAL MEDICINE

## 2020-01-01 PROCEDURE — 96372 THER/PROPH/DIAG INJ SC/IM: CPT | Performed by: INTERNAL MEDICINE

## 2020-01-01 PROCEDURE — 70450 CT HEAD/BRAIN W/O DYE: CPT

## 2020-01-01 PROCEDURE — 81003 URINALYSIS AUTO W/O SCOPE: CPT | Performed by: EMERGENCY MEDICINE

## 2020-01-01 PROCEDURE — 25010000002 DEXAMETHASONE PER 1 MG: Performed by: INTERNAL MEDICINE

## 2020-01-01 PROCEDURE — 85610 PROTHROMBIN TIME: CPT | Performed by: RADIOLOGY

## 2020-01-01 PROCEDURE — 70553 MRI BRAIN STEM W/O & W/DYE: CPT

## 2020-01-01 PROCEDURE — 99233 SBSQ HOSP IP/OBS HIGH 50: CPT | Performed by: INTERNAL MEDICINE

## 2020-01-01 PROCEDURE — 81003 URINALYSIS AUTO W/O SCOPE: CPT | Performed by: INTERNAL MEDICINE

## 2020-01-01 PROCEDURE — 85007 BL SMEAR W/DIFF WBC COUNT: CPT | Performed by: EMERGENCY MEDICINE

## 2020-01-01 PROCEDURE — 71045 X-RAY EXAM CHEST 1 VIEW: CPT

## 2020-01-01 PROCEDURE — 99348 HOME/RES VST EST LOW MDM 30: CPT | Performed by: INTERNAL MEDICINE

## 2020-01-01 PROCEDURE — U0003 INFECTIOUS AGENT DETECTION BY NUCLEIC ACID (DNA OR RNA); SEVERE ACUTE RESPIRATORY SYNDROME CORONAVIRUS 2 (SARS-COV-2) (CORONAVIRUS DISEASE [COVID-19]), AMPLIFIED PROBE TECHNIQUE, MAKING USE OF HIGH THROUGHPUT TECHNOLOGIES AS DESCRIBED BY CMS-2020-01-R: HCPCS | Performed by: PHYSICIAN ASSISTANT

## 2020-01-01 PROCEDURE — 99213 OFFICE O/P EST LOW 20 MIN: CPT | Performed by: NURSE PRACTITIONER

## 2020-01-01 PROCEDURE — 85025 COMPLETE CBC W/AUTO DIFF WBC: CPT | Performed by: EMERGENCY MEDICINE

## 2020-01-01 PROCEDURE — 83540 ASSAY OF IRON: CPT | Performed by: INTERNAL MEDICINE

## 2020-01-01 PROCEDURE — C9803 HOPD COVID-19 SPEC COLLECT: HCPCS | Performed by: PHYSICIAN ASSISTANT

## 2020-01-01 PROCEDURE — 77307 TELETHX ISODOSE PLAN CPLX: CPT | Performed by: RADIOLOGY

## 2020-01-01 PROCEDURE — 72156 MRI NECK SPINE W/O & W/DYE: CPT

## 2020-01-01 PROCEDURE — 85049 AUTOMATED PLATELET COUNT: CPT | Performed by: RADIOLOGY

## 2020-01-01 PROCEDURE — 0JB73ZX EXCISION OF BACK SUBCUTANEOUS TISSUE AND FASCIA, PERCUTANEOUS APPROACH, DIAGNOSTIC: ICD-10-PCS | Performed by: INTERNAL MEDICINE

## 2020-01-01 PROCEDURE — 82565 ASSAY OF CREATININE: CPT

## 2020-01-01 PROCEDURE — 80053 COMPREHEN METABOLIC PANEL: CPT | Performed by: INTERNAL MEDICINE

## 2020-01-01 PROCEDURE — A9577 INJ MULTIHANCE: HCPCS | Performed by: PHYSICIAN ASSISTANT

## 2020-01-01 PROCEDURE — 0 GADOBENATE DIMEGLUMINE 529 MG/ML SOLUTION: Performed by: PHYSICIAN ASSISTANT

## 2020-01-01 PROCEDURE — 99222 1ST HOSP IP/OBS MODERATE 55: CPT | Performed by: CLINICAL NURSE SPECIALIST

## 2020-01-01 PROCEDURE — 88333 PATH CONSLTJ SURG CYTO XM 1: CPT | Performed by: PHYSICIAN ASSISTANT

## 2020-01-01 PROCEDURE — 99497 ADVNCD CARE PLAN 30 MIN: CPT | Performed by: CLINICAL NURSE SPECIALIST

## 2020-01-01 PROCEDURE — 84466 ASSAY OF TRANSFERRIN: CPT | Performed by: INTERNAL MEDICINE

## 2020-01-01 PROCEDURE — 77338 DESIGN MLC DEVICE FOR IMRT: CPT | Performed by: RADIOLOGY

## 2020-01-01 PROCEDURE — 99342 HOME/RES VST NEW LOW MDM 30: CPT | Performed by: INTERNAL MEDICINE

## 2020-01-01 PROCEDURE — 77301 RADIOTHERAPY DOSE PLAN IMRT: CPT | Performed by: RADIOLOGY

## 2020-01-01 PROCEDURE — 88341 IMHCHEM/IMCYTCHM EA ADD ANTB: CPT | Performed by: PHYSICIAN ASSISTANT

## 2020-01-01 PROCEDURE — 82550 ASSAY OF CK (CPK): CPT | Performed by: INTERNAL MEDICINE

## 2020-01-01 PROCEDURE — 85730 THROMBOPLASTIN TIME PARTIAL: CPT | Performed by: RADIOLOGY

## 2020-01-01 PROCEDURE — 77290 THER RAD SIMULAJ FIELD CPLX: CPT | Performed by: RADIOLOGY

## 2020-01-01 PROCEDURE — G0463 HOSPITAL OUTPT CLINIC VISIT: HCPCS | Performed by: RADIOLOGY

## 2020-01-01 PROCEDURE — 88172 CYTP DX EVAL FNA 1ST EA SITE: CPT | Performed by: PHYSICIAN ASSISTANT

## 2020-01-01 PROCEDURE — 36415 COLL VENOUS BLD VENIPUNCTURE: CPT | Performed by: INTERNAL MEDICINE

## 2020-01-01 PROCEDURE — 80053 COMPREHEN METABOLIC PANEL: CPT | Performed by: EMERGENCY MEDICINE

## 2020-01-01 PROCEDURE — 84443 ASSAY THYROID STIM HORMONE: CPT | Performed by: INTERNAL MEDICINE

## 2020-01-01 PROCEDURE — 99232 SBSQ HOSP IP/OBS MODERATE 35: CPT | Performed by: CLINICAL NURSE SPECIALIST

## 2020-01-01 PROCEDURE — 94799 UNLISTED PULMONARY SVC/PX: CPT

## 2020-01-01 PROCEDURE — 90694 VACC AIIV4 NO PRSRV 0.5ML IM: CPT | Performed by: INTERNAL MEDICINE

## 2020-01-01 PROCEDURE — 77300 RADIATION THERAPY DOSE PLAN: CPT | Performed by: RADIOLOGY

## 2020-01-01 PROCEDURE — 99284 EMERGENCY DEPT VISIT MOD MDM: CPT

## 2020-01-01 PROCEDURE — 77417 THER RADIOLOGY PORT IMAGE(S): CPT | Performed by: RADIOLOGY

## 2020-01-01 RX ORDER — DEXAMETHASONE 4 MG/1
4 TABLET ORAL 2 TIMES DAILY WITH MEALS
Qty: 30 TABLET | Refills: 0 | Status: SHIPPED | OUTPATIENT
Start: 2020-01-01

## 2020-01-01 RX ORDER — OXYCODONE HYDROCHLORIDE AND ACETAMINOPHEN 5; 325 MG/1; MG/1
1 TABLET ORAL EVERY 8 HOURS PRN
Qty: 30 TABLET | Refills: 0 | Status: SHIPPED | OUTPATIENT
Start: 2020-01-01

## 2020-01-01 RX ORDER — ACETAMINOPHEN 325 MG/1
650 TABLET ORAL EVERY 4 HOURS PRN
Status: DISCONTINUED | OUTPATIENT
Start: 2020-01-01 | End: 2020-01-01 | Stop reason: HOSPADM

## 2020-01-01 RX ORDER — LACTULOSE 10 G/15ML
20 SOLUTION ORAL NIGHTLY PRN
Qty: 150 ML | Refills: 0 | Status: SHIPPED | OUTPATIENT
Start: 2020-01-01

## 2020-01-01 RX ORDER — ATENOLOL 25 MG/1
50 TABLET ORAL DAILY
Status: DISCONTINUED | OUTPATIENT
Start: 2020-01-01 | End: 2020-01-01 | Stop reason: HOSPADM

## 2020-01-01 RX ORDER — SODIUM CHLORIDE 9 MG/ML
50 INJECTION, SOLUTION INTRAVENOUS CONTINUOUS
Status: DISCONTINUED | OUTPATIENT
Start: 2020-01-01 | End: 2020-01-01

## 2020-01-01 RX ORDER — SODIUM CHLORIDE 0.9 % (FLUSH) 0.9 %
10 SYRINGE (ML) INJECTION AS NEEDED
Status: DISCONTINUED | OUTPATIENT
Start: 2020-01-01 | End: 2020-01-01 | Stop reason: HOSPADM

## 2020-01-01 RX ORDER — TIMOLOL MALEATE 5 MG/ML
0.5 SOLUTION OPHTHALMIC DAILY
COMMUNITY
Start: 2020-01-01

## 2020-01-01 RX ORDER — ONDANSETRON 2 MG/ML
4 INJECTION INTRAMUSCULAR; INTRAVENOUS EVERY 6 HOURS PRN
Status: DISCONTINUED | OUTPATIENT
Start: 2020-01-01 | End: 2020-01-01 | Stop reason: HOSPADM

## 2020-01-01 RX ORDER — MULTIVIT-MIN/IRON FUM/FOLIC AC 7.5 MG-4
1 TABLET ORAL DAILY
Qty: 30 TABLET | Refills: 11 | Status: SHIPPED | OUTPATIENT
Start: 2020-01-01 | End: 2021-07-02

## 2020-01-01 RX ORDER — DEXAMETHASONE SODIUM PHOSPHATE 10 MG/ML
1 INJECTION, SOLUTION INTRAMUSCULAR; INTRAVENOUS ONCE
Status: COMPLETED | OUTPATIENT
Start: 2020-01-01 | End: 2020-01-01

## 2020-01-01 RX ORDER — CYANOCOBALAMIN 1000 UG/ML
1000 INJECTION, SOLUTION INTRAMUSCULAR; SUBCUTANEOUS
Status: SHIPPED | OUTPATIENT
Start: 2020-01-01

## 2020-01-01 RX ORDER — NIFEDIPINE 60 MG/1
60 TABLET, EXTENDED RELEASE ORAL DAILY
Qty: 30 TABLET | Refills: 1 | Status: SHIPPED | OUTPATIENT
Start: 2020-01-01 | End: 2020-01-01 | Stop reason: SDUPTHER

## 2020-01-01 RX ORDER — DEXAMETHASONE 6 MG/1
6 TABLET ORAL
Qty: 30 TABLET | Refills: 1 | Status: CANCELLED | OUTPATIENT
Start: 2020-01-01

## 2020-01-01 RX ORDER — DEXAMETHASONE 6 MG/1
6 TABLET ORAL
Qty: 30 TABLET | Refills: 0 | Status: SHIPPED | OUTPATIENT
Start: 2020-01-01 | End: 2020-01-01 | Stop reason: HOSPADM

## 2020-01-01 RX ORDER — TRIAMCINOLONE ACETONIDE 40 MG/ML
40 INJECTION, SUSPENSION INTRA-ARTICULAR; INTRAMUSCULAR ONCE
Status: COMPLETED | OUTPATIENT
Start: 2020-01-01 | End: 2020-01-01

## 2020-01-01 RX ORDER — MEGESTROL ACETATE 40 MG/1
40 TABLET ORAL DAILY
Status: DISCONTINUED | OUTPATIENT
Start: 2020-01-01 | End: 2020-01-01 | Stop reason: HOSPADM

## 2020-01-01 RX ORDER — CEFDINIR 300 MG/1
300 CAPSULE ORAL 2 TIMES DAILY
Qty: 14 CAPSULE | Refills: 0 | Status: SHIPPED | OUTPATIENT
Start: 2020-01-01 | End: 2020-01-01 | Stop reason: HOSPADM

## 2020-01-01 RX ORDER — SODIUM CHLORIDE 0.9 % (FLUSH) 0.9 %
10 SYRINGE (ML) INJECTION EVERY 12 HOURS SCHEDULED
Status: DISCONTINUED | OUTPATIENT
Start: 2020-01-01 | End: 2020-01-01 | Stop reason: HOSPADM

## 2020-01-01 RX ORDER — CYANOCOBALAMIN 1000 UG/ML
1000 INJECTION, SOLUTION INTRAMUSCULAR; SUBCUTANEOUS
Status: DISCONTINUED | OUTPATIENT
Start: 2020-01-01 | End: 2020-01-01

## 2020-01-01 RX ORDER — DEXAMETHASONE SODIUM PHOSPHATE 10 MG/ML
10 INJECTION INTRAMUSCULAR; INTRAVENOUS ONCE
Status: DISCONTINUED | OUTPATIENT
Start: 2020-01-01 | End: 2020-01-01

## 2020-01-01 RX ORDER — CYANOCOBALAMIN 1000 UG/ML
1000 INJECTION, SOLUTION INTRAMUSCULAR; SUBCUTANEOUS ONCE
Status: COMPLETED | OUTPATIENT
Start: 2020-01-01 | End: 2020-01-01

## 2020-01-01 RX ORDER — ATENOLOL 50 MG/1
50 TABLET ORAL DAILY
Qty: 90 TABLET | Refills: 3 | Status: SHIPPED | OUTPATIENT
Start: 2020-01-01

## 2020-01-01 RX ORDER — DEXAMETHASONE SODIUM PHOSPHATE 4 MG/ML
4 INJECTION, SOLUTION INTRA-ARTICULAR; INTRALESIONAL; INTRAMUSCULAR; INTRAVENOUS; SOFT TISSUE EVERY 8 HOURS
Status: DISCONTINUED | OUTPATIENT
Start: 2020-01-01 | End: 2020-01-01 | Stop reason: HOSPADM

## 2020-01-01 RX ORDER — ONDANSETRON 4 MG/1
4 TABLET, FILM COATED ORAL EVERY 6 HOURS PRN
Status: DISCONTINUED | OUTPATIENT
Start: 2020-01-01 | End: 2020-01-01 | Stop reason: HOSPADM

## 2020-01-01 RX ORDER — ATENOLOL 50 MG/1
50 TABLET ORAL DAILY
Qty: 30 TABLET | Refills: 6 | Status: SHIPPED | OUTPATIENT
Start: 2020-01-01 | End: 2020-01-01 | Stop reason: SDUPTHER

## 2020-01-01 RX ORDER — ERGOCALCIFEROL 1.25 MG/1
50000 CAPSULE ORAL WEEKLY
Qty: 5 CAPSULE | Refills: 5 | Status: SHIPPED | OUTPATIENT
Start: 2020-01-01 | End: 2020-01-01 | Stop reason: HOSPADM

## 2020-01-01 RX ORDER — NIFEDIPINE 60 MG/1
60 TABLET, EXTENDED RELEASE ORAL DAILY
Status: DISCONTINUED | OUTPATIENT
Start: 2020-01-01 | End: 2020-01-01 | Stop reason: HOSPADM

## 2020-01-01 RX ORDER — NIFEDIPINE 60 MG/1
60 TABLET, EXTENDED RELEASE ORAL DAILY
Qty: 30 TABLET | Refills: 1 | Status: SHIPPED | OUTPATIENT
Start: 2020-01-01

## 2020-01-01 RX ORDER — CALCIUM CARBONATE 160(400)MG
1 TABLET,CHEWABLE ORAL DAILY
Qty: 1 EACH | Refills: 0 | Status: SHIPPED | OUTPATIENT
Start: 2020-01-01

## 2020-01-01 RX ADMIN — ACETAMINOPHEN 650 MG: 325 TABLET, FILM COATED ORAL at 20:52

## 2020-01-01 RX ADMIN — SODIUM CHLORIDE, PRESERVATIVE FREE 10 ML: 5 INJECTION INTRAVENOUS at 21:00

## 2020-01-01 RX ADMIN — MEGESTROL ACETATE 40 MG: 40 TABLET ORAL at 09:29

## 2020-01-01 RX ADMIN — ATENOLOL 50 MG: 25 TABLET ORAL at 09:22

## 2020-01-01 RX ADMIN — TRIAMCINOLONE ACETONIDE 40 MG: 40 INJECTION, SUSPENSION INTRA-ARTICULAR; INTRAMUSCULAR at 12:14

## 2020-01-01 RX ADMIN — DEXAMETHASONE SODIUM PHOSPHATE 4 MG: 4 INJECTION, SOLUTION INTRA-ARTICULAR; INTRALESIONAL; INTRAMUSCULAR; INTRAVENOUS; SOFT TISSUE at 05:23

## 2020-01-01 RX ADMIN — DEXAMETHASONE SODIUM PHOSPHATE 4 MG: 4 INJECTION, SOLUTION INTRA-ARTICULAR; INTRALESIONAL; INTRAMUSCULAR; INTRAVENOUS; SOFT TISSUE at 21:00

## 2020-01-01 RX ADMIN — DEXAMETHASONE SODIUM PHOSPHATE 4 MG: 4 INJECTION, SOLUTION INTRA-ARTICULAR; INTRALESIONAL; INTRAMUSCULAR; INTRAVENOUS; SOFT TISSUE at 14:12

## 2020-01-01 RX ADMIN — SODIUM CHLORIDE, PRESERVATIVE FREE 10 ML: 5 INJECTION INTRAVENOUS at 09:21

## 2020-01-01 RX ADMIN — SODIUM CHLORIDE 50 ML/HR: 9 INJECTION, SOLUTION INTRAVENOUS at 21:43

## 2020-01-01 RX ADMIN — DEXAMETHASONE SODIUM PHOSPHATE 4 MG: 4 INJECTION, SOLUTION INTRA-ARTICULAR; INTRALESIONAL; INTRAMUSCULAR; INTRAVENOUS; SOFT TISSUE at 05:30

## 2020-01-01 RX ADMIN — SODIUM CHLORIDE 1000 ML: 9 INJECTION, SOLUTION INTRAVENOUS at 16:20

## 2020-01-01 RX ADMIN — SODIUM CHLORIDE 50 ML/HR: 9 INJECTION, SOLUTION INTRAVENOUS at 20:52

## 2020-01-01 RX ADMIN — DEXAMETHASONE SODIUM PHOSPHATE 4 MG: 4 INJECTION, SOLUTION INTRA-ARTICULAR; INTRALESIONAL; INTRAMUSCULAR; INTRAVENOUS; SOFT TISSUE at 14:39

## 2020-01-01 RX ADMIN — SODIUM CHLORIDE, PRESERVATIVE FREE 10 ML: 5 INJECTION INTRAVENOUS at 21:43

## 2020-01-01 RX ADMIN — APIXABAN 2.5 MG: 2.5 TABLET, FILM COATED ORAL at 10:24

## 2020-01-01 RX ADMIN — CYANOCOBALAMIN 1000 MCG: 1000 INJECTION, SOLUTION INTRAMUSCULAR; SUBCUTANEOUS at 17:03

## 2020-01-01 RX ADMIN — CYANOCOBALAMIN 1000 MCG: 1000 INJECTION, SOLUTION INTRAMUSCULAR; SUBCUTANEOUS at 12:13

## 2020-01-01 RX ADMIN — NIFEDIPINE 60 MG: 60 TABLET, FILM COATED, EXTENDED RELEASE ORAL at 10:24

## 2020-01-01 RX ADMIN — DEXAMETHASONE SODIUM PHOSPHATE 4 MG: 4 INJECTION, SOLUTION INTRA-ARTICULAR; INTRALESIONAL; INTRAMUSCULAR; INTRAVENOUS; SOFT TISSUE at 21:43

## 2020-01-01 RX ADMIN — DEXAMETHASONE SODIUM PHOSPHATE 4 MG: 4 INJECTION, SOLUTION INTRA-ARTICULAR; INTRALESIONAL; INTRAMUSCULAR; INTRAVENOUS; SOFT TISSUE at 06:33

## 2020-01-01 RX ADMIN — ACETAMINOPHEN 650 MG: 325 TABLET, FILM COATED ORAL at 00:21

## 2020-01-01 RX ADMIN — SODIUM CHLORIDE, PRESERVATIVE FREE 10 ML: 5 INJECTION INTRAVENOUS at 09:31

## 2020-01-01 RX ADMIN — NIFEDIPINE 60 MG: 60 TABLET, FILM COATED, EXTENDED RELEASE ORAL at 09:20

## 2020-01-01 RX ADMIN — CYANOCOBALAMIN 1000 MCG: 1000 INJECTION, SOLUTION INTRAMUSCULAR; SUBCUTANEOUS at 13:59

## 2020-01-01 RX ADMIN — NIFEDIPINE 60 MG: 60 TABLET, FILM COATED, EXTENDED RELEASE ORAL at 09:29

## 2020-01-01 RX ADMIN — DEXAMETHASONE SODIUM PHOSPHATE 4 MG: 4 INJECTION, SOLUTION INTRA-ARTICULAR; INTRALESIONAL; INTRAMUSCULAR; INTRAVENOUS; SOFT TISSUE at 22:00

## 2020-01-01 RX ADMIN — APIXABAN 2.5 MG: 2.5 TABLET, FILM COATED ORAL at 21:43

## 2020-01-01 RX ADMIN — ACETAMINOPHEN 650 MG: 325 TABLET, FILM COATED ORAL at 10:47

## 2020-01-01 RX ADMIN — GADOBENATE DIMEGLUMINE 9 ML: 529 INJECTION, SOLUTION INTRAVENOUS at 12:00

## 2020-01-01 RX ADMIN — MEGESTROL ACETATE 40 MG: 40 TABLET ORAL at 09:20

## 2020-01-01 RX ADMIN — CYANOCOBALAMIN 1000 MCG: 1000 INJECTION, SOLUTION INTRAMUSCULAR; SUBCUTANEOUS at 09:01

## 2020-01-01 RX ADMIN — MEGESTROL ACETATE 40 MG: 40 TABLET ORAL at 10:24

## 2020-01-01 RX ADMIN — DEXAMETHASONE SODIUM PHOSPHATE 1 MG: 10 INJECTION, SOLUTION INTRAMUSCULAR; INTRAVENOUS at 12:14

## 2020-01-01 RX ADMIN — SODIUM CHLORIDE 500 ML: 9 INJECTION, SOLUTION INTRAVENOUS at 15:35

## 2020-01-01 RX ADMIN — CYANOCOBALAMIN 1000 MCG: 1000 INJECTION, SOLUTION INTRAMUSCULAR; SUBCUTANEOUS at 13:46

## 2020-01-21 NOTE — TELEPHONE ENCOUNTER
Dr Cordon asked me to call and just see how pt is doing,  lvm asking pt to just call back and let our office know.

## 2020-03-02 NOTE — PROGRESS NOTES
Venipuncture Blood Specimen Collection  Venipuncture performed in the left ac by Viktoriya Quiroz MA with good hemostasis. Patient tolerated the procedure well without complications.   03/02/20   Viktoriya Quiroz MA

## 2020-03-03 NOTE — PROGRESS NOTES
Please let patient know that  thyroid labs look good. Blood counts and kidney function look good. Sodium up a touch, so maybe mild dehydration.   See if she needs anything and ask how she's getting around.

## 2020-03-09 NOTE — PROGRESS NOTES
Subjective     Chief Complaint   Patient presents with   • Follow-up     3 mo fu   • Leg Injury     left       History of Present Illness  Patient fell last Friday getting out of chair. Landed on her left side. Left ankle and foot edema. Mild left shoulder pain.  Weight has been stable and she states that she has been eating well. Still drinking two ensures daily.   Patient has been using her home 02.   No chest pain. Breathing about the same.   No hematuria and no blood.   Neighbor has been helping her. States that she still occasionally drives.   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   Constipation. Eating prunes. No blood in stool.      Patient's PMR from outside medical facility reviewed and noted.    Review of Systems   Constitutional: Negative for chills and fever.   HENT: Negative for congestion and sore throat.    Eyes: Negative for discharge and redness.   Respiratory: Negative for cough and wheezing.    Cardiovascular: Positive for leg swelling. Negative for palpitations.   Gastrointestinal: Positive for constipation. Negative for diarrhea, nausea and vomiting.   Genitourinary: Negative for dysuria and hematuria.   Musculoskeletal: Positive for gait problem.        Ambulating with a cane. Recent fall.    Skin: Positive for color change and wound.   Psychiatric/Behavioral: Negative for confusion and sleep disturbance.        Otherwise complete ROS reviewed and negative except as mentioned in the HPI.    Past Medical History:   Past Medical History:   Diagnosis Date   • Broken arm     left   • Hypertension      Past Surgical History:  Past Surgical History:   Procedure Laterality Date   • CHOLECYSTECTOMY     • FRACTURE SURGERY Left 2019    arm   • HYSTERECTOMY       Social History:  reports that she has never smoked. She has never used smokeless tobacco. She reports that she drinks alcohol. She reports that she does not use drugs.    Family History: family history includes Cancer in her  "sister; Heart attack in her brother; Lung disease in her father.       Allergies:  No Known Allergies  Medications:  Prior to Admission medications    Medication Sig Start Date End Date Taking? Authorizing Provider   atenolol (TENORMIN) 50 MG tablet Take 1 tablet by mouth Daily. 8/13/19  Yes Love Cordon DO   docusate sodium (COLACE) 100 MG capsule Take 100 mg by mouth Daily. 10/3/19  Yes Juan Mo MD   ELIQUIS 5 MG tablet tablet Take 1 tablet by mouth 2 (Two) Times a Day. 12/5/19  Yes Love Cordon DO   megestrol (MEGACE) 40 MG tablet Take 40 mg by mouth Every Morning. 10/3/19  Yes Juan Mo MD   vitamin D (ERGOCALCIFEROL) 04596 units capsule capsule Take 50,000 Units by mouth 1 (One) Time Per Week. 10/3/19   Juan Mo MD       Objective     Vital Signs: /77 (BP Location: Left arm, Patient Position: Sitting, Cuff Size: Adult)   Pulse 80   Temp 98.2 °F (36.8 °C) (Temporal)   Ht 165.1 cm (65\")   Wt 46.9 kg (103 lb 6.4 oz)   SpO2 95%   Breastfeeding No   BMI 17.21 kg/m²   Physical Exam   Constitutional:   Thin and frail. Skin is thin and frail.    HENT:   Head: Normocephalic and atraumatic.   Nose: Nose normal.   Mouth/Throat: Oropharynx is clear and moist.   Right eye lesion. Medially. Bilateral hearing aides.    Eyes: Conjunctivae and EOM are normal.   Neck: Normal range of motion. Neck supple.   Cardiovascular: Normal rate and normal heart sounds.   Irregular   Pulmonary/Chest: Effort normal and breath sounds normal. She has no wheezes.   Abdominal: Soft. Bowel sounds are normal. She exhibits no distension. There is tenderness.   Musculoskeletal: She exhibits no edema or tenderness.   Decreased left ankle ROM. Left ankle has edema and is purple discolored. ROM of the upper extremities is intact.    Neurological: She is alert. No cranial nerve deficit.   Skin: Skin is warm and dry. There is erythema.   Psychiatric: She has a normal mood and affect. " Thought content normal.   Vitals reviewed.      Patient's Body mass index is 17.21 kg/m². BMI is below normal parameters. Recommendations include: treating the underlying disease process.      Results Reviewed:  Glucose   Date Value Ref Range Status   09/23/2019 104 (H) 65 - 99 mg/dL Final     BUN   Date Value Ref Range Status   03/02/2020 15 10 - 36 mg/dL Final   09/23/2019 12 8 - 23 mg/dL Final     Creatinine   Date Value Ref Range Status   03/02/2020 0.73 0.57 - 1.00 mg/dL Final   09/23/2019 0.42 (L) 0.57 - 1.00 mg/dL Final     Sodium   Date Value Ref Range Status   03/02/2020 147 (H) 134 - 144 mmol/L Final   09/23/2019 143 136 - 145 mmol/L Final     Potassium   Date Value Ref Range Status   03/02/2020 5.2 3.5 - 5.2 mmol/L Final   09/23/2019 3.9 3.5 - 5.2 mmol/L Final     Chloride   Date Value Ref Range Status   03/02/2020 106 96 - 106 mmol/L Final   09/23/2019 104 98 - 107 mmol/L Final     CO2   Date Value Ref Range Status   09/23/2019 31.0 (H) 22.0 - 29.0 mmol/L Final     Total CO2   Date Value Ref Range Status   03/02/2020 24 20 - 29 mmol/L Final     Calcium   Date Value Ref Range Status   03/02/2020 9.7 8.7 - 10.3 mg/dL Final   09/23/2019 9.1 8.2 - 9.6 mg/dL Final     ALT (SGPT)   Date Value Ref Range Status   03/02/2020 12 0 - 32 IU/L Final   09/20/2019 5 1 - 33 U/L Final     AST (SGOT)   Date Value Ref Range Status   03/02/2020 17 0 - 40 IU/L Final   09/20/2019 11 1 - 32 U/L Final     WBC   Date Value Ref Range Status   03/02/2020 7.1 3.4 - 10.8 x10E3/uL Final     Hematocrit   Date Value Ref Range Status   03/02/2020 47.0 (H) 34.0 - 46.6 % Final   09/23/2019 38.7 34.0 - 46.6 % Final     Platelets   Date Value Ref Range Status   03/02/2020 510 (H) 150 - 450 x10E3/uL Final   09/23/2019 270 140 - 450 10*3/mm3 Final     Total Cholesterol   Date Value Ref Range Status   09/20/2019 132 0 - 200 mg/dL Final     Triglycerides   Date Value Ref Range Status   09/20/2019 78 0 - 150 mg/dL Final     HDL Cholesterol      Date Value Ref Range Status   09/20/2019 49 40 - 60 mg/dL Final     LDL Cholesterol    Date Value Ref Range Status   09/20/2019 67 0 - 100 mg/dL Final     LDL/HDL Ratio   Date Value Ref Range Status   09/20/2019 1.38  Final         Assessment / Plan     Assessment/Plan:  1. Acute left ankle pain  - XR Ankle 3+ View Left (In Office)  - ACE wrap    2. Vitamin D deficiency  - vitamin D (ERGOCALCIFEROL) 1.25 MG (70166 UT) capsule capsule; Take 1 capsule by mouth 1 (One) Time Per Week.  Dispense: 5 capsule; Refill: 5    3. Pupillary abnormality of right eye  - Concern with Ptyrgium. Asked patient to follow up with eye doctor.     4. Essential hypertension  - Continue atenolol  - Check BP daily at home.     5. Chronic pulmonary embolism, unspecified pulmonary embolism type, unspecified whether acute cor pulmonale present (CMS/HCC)  - Continue Eliquis. Patient had irregular HB in the office. Suspect A fib.    Discussed mild dehydration on labs and asked her to try and drink more fluids/water.   Patient got B12 in the office when she had last lab work checked.     Return in about 6 months (around 9/9/2020) for Recheck, Next scheduled follow up. unless patient needs to be seen sooner or acute issues arise.    Code Status: Full    I have discussed the patient results/orders and and plan/recommendation with them at today's visit.      Love Cordon, DO   03/09/2020

## 2020-06-01 NOTE — TELEPHONE ENCOUNTER
CALLED PT TO SCHEDULE HER 1 MO FU. SHE STATED THAT SHE HAS NO ENERGY. SHE SAID SHE HARDLY HAS THE ENERGY TO GET UP FROM HER CHAIR. DO YOU WANT TO SEE HER FOR THAT OR JUST CALL HER SOMETHING IN?

## 2020-06-02 NOTE — TELEPHONE ENCOUNTER
I spoke to patient. She states that her arm is feeling better and that the home health nurse put a new dressing on it today. She states that she feels like the area is healing well. She states that she has a decrease in energy lately but no other symptoms. I let patient know that Dr. Cordon called in a decrease in dosage of eliquis. Patient voices understanding and states that she will get the medication from the pharmacy tomorrow.

## 2020-06-03 NOTE — TELEPHONE ENCOUNTER
ENRIQUETA Ramires from Home [facility] called to let Dr. Cordon know that the patient's wound is getting better. However she has refused physical therapy twice.

## 2020-06-26 NOTE — TELEPHONE ENCOUNTER
Called Pt to check on her as Dr. Cordon requested.  Pt is doing really well although feels really weak still.  No energy.  Pt will be coming to her FU appt on July 2nd.

## 2020-07-02 NOTE — PROGRESS NOTES
Subjective     Chief Complaint   Patient presents with   • Follow-up     doing better,arm is healing        History of Present Illness  Patient states that her BP is not elevated at home. States that she drove with a friend and it got her nerves aggravated. Patient states that she has been eating without difficulty at home.  Has a lesion on the right side of her face.     Left arm lesion has healed. No significant deficit and no open wound.    Patient statest hat B12 shot helped her strength somewhat.   Patient states that she drinks two ensures a day. States that when she doesn't drink it, she can tell.     Patient's PMR from outside medical facility reviewed and noted.    Review of Systems   Constitutional: Positive for fatigue. Negative for chills and fever.   HENT: Negative for congestion and rhinorrhea.    Respiratory: Positive for shortness of breath. Negative for cough.    Cardiovascular: Negative for chest pain and leg swelling.   Gastrointestinal: Negative for diarrhea, nausea and vomiting.   Skin: Negative for color change and wound.   Neurological: Positive for weakness. Negative for dizziness and headaches.      Otherwise complete ROS reviewed and negative except as mentioned in the HPI.    Past Medical History:   Past Medical History:   Diagnosis Date   • Broken arm     left   • Hypertension      Past Surgical History:  Past Surgical History:   Procedure Laterality Date   • CHOLECYSTECTOMY     • FRACTURE SURGERY Left 2019    arm   • HYSTERECTOMY       Social History:  reports that she has never smoked. She has never used smokeless tobacco. She reports that she drinks alcohol. She reports that she does not use drugs.    Family History: family history includes Cancer in her sister; Heart attack in her brother; Lung disease in her father.       Allergies:  No Known Allergies  Medications:  Prior to Admission medications    Medication Sig Start Date End Date Taking? Authorizing Provider   apixaban  "(ELIQUIS) 2.5 MG tablet tablet Take 1 tablet by mouth Every 12 (Twelve) Hours. 6/2/20  Yes Love Cordon DO   atenolol (TENORMIN) 50 MG tablet Take 1 tablet by mouth Daily. 3/10/20  Yes Love Cordon DO   docusate sodium (COLACE) 100 MG capsule Take 100 mg by mouth Daily. 10/3/19  Yes Juan Mo MD   megestrol (MEGACE) 40 MG tablet Take 40 mg by mouth Every Morning. 10/3/19  Yes Juan Mo MD   mupirocin (BACTROBAN) 2 % ointment APPLY THREE TIMES DAILY 5/22/20  Yes Juan Mo MD   timolol (TIMOPTIC-XR) 0.5 % ophthalmic gel-forming Administer 0.5 drops to both eyes Daily. 6/4/20  Yes Juan Mo MD   vitamin D (ERGOCALCIFEROL) 1.25 MG (53688 UT) capsule capsule Take 1 capsule by mouth 1 (One) Time Per Week. 3/9/20  Yes Love Cordon DO   vitamin D (ERGOCALCIFEROL) 74876 units capsule capsule Take 50,000 Units by mouth 1 (One) Time Per Week. 10/3/19  Yes Juan Mo MD       Objective     Vital Signs: BP (!) 181/76 (BP Location: Left arm, Patient Position: Sitting, Cuff Size: Small Adult)   Pulse 79   Temp 98.3 °F (36.8 °C) (Skin)   Ht 165.1 cm (65\")   Wt 46.3 kg (102 lb)   SpO2 95%   Breastfeeding No   BMI 16.97 kg/m²   Physical Exam   Constitutional:   Thin and frail   HENT:   Head: Normocephalic and atraumatic.   Nose: Nose normal.   Mouth/Throat: Oropharynx is clear and moist.   Eyes: Conjunctivae and EOM are normal.   Neck: Normal range of motion. Neck supple.   Cardiovascular: Normal rate, regular rhythm and normal heart sounds.   Irregular with inspiration   Pulmonary/Chest: Effort normal and breath sounds normal.   Abdominal: Soft. Bowel sounds are normal.   Musculoskeletal: She exhibits no edema or tenderness.   Neurological: She is alert. No cranial nerve deficit.   Skin: Skin is warm and dry.   Psychiatric: She has a normal mood and affect.   Vitals reviewed.      Patient's Body mass index is 16.97 kg/m². BMI is below normal " parameters. Recommendations include: treating the underlying disease process.      Results Reviewed:  Glucose   Date Value Ref Range Status   09/23/2019 104 (H) 65 - 99 mg/dL Final     BUN   Date Value Ref Range Status   03/02/2020 15 10 - 36 mg/dL Final   09/23/2019 12 8 - 23 mg/dL Final     Creatinine   Date Value Ref Range Status   03/02/2020 0.73 0.57 - 1.00 mg/dL Final   09/23/2019 0.42 (L) 0.57 - 1.00 mg/dL Final     Sodium   Date Value Ref Range Status   03/02/2020 147 (H) 134 - 144 mmol/L Final   09/23/2019 143 136 - 145 mmol/L Final     Potassium   Date Value Ref Range Status   03/02/2020 5.2 3.5 - 5.2 mmol/L Final   09/23/2019 3.9 3.5 - 5.2 mmol/L Final     Chloride   Date Value Ref Range Status   03/02/2020 106 96 - 106 mmol/L Final   09/23/2019 104 98 - 107 mmol/L Final     CO2   Date Value Ref Range Status   09/23/2019 31.0 (H) 22.0 - 29.0 mmol/L Final     Total CO2   Date Value Ref Range Status   03/02/2020 24 20 - 29 mmol/L Final     Calcium   Date Value Ref Range Status   03/02/2020 9.7 8.7 - 10.3 mg/dL Final   09/23/2019 9.1 8.2 - 9.6 mg/dL Final     ALT (SGPT)   Date Value Ref Range Status   03/02/2020 12 0 - 32 IU/L Final   09/20/2019 5 1 - 33 U/L Final     AST (SGOT)   Date Value Ref Range Status   03/02/2020 17 0 - 40 IU/L Final   09/20/2019 11 1 - 32 U/L Final     WBC   Date Value Ref Range Status   03/02/2020 7.1 3.4 - 10.8 x10E3/uL Final     Hematocrit   Date Value Ref Range Status   03/02/2020 47.0 (H) 34.0 - 46.6 % Final   09/23/2019 38.7 34.0 - 46.6 % Final     Platelets   Date Value Ref Range Status   03/02/2020 510 (H) 150 - 450 x10E3/uL Final   09/23/2019 270 140 - 450 10*3/mm3 Final     Total Cholesterol   Date Value Ref Range Status   09/20/2019 132 0 - 200 mg/dL Final     Triglycerides   Date Value Ref Range Status   09/20/2019 78 0 - 150 mg/dL Final     HDL Cholesterol   Date Value Ref Range Status   09/20/2019 49 40 - 60 mg/dL Final     LDL Cholesterol    Date Value Ref Range  Status   09/20/2019 67 0 - 100 mg/dL Final     LDL/HDL Ratio   Date Value Ref Range Status   09/20/2019 1.38  Final         Assessment / Plan     Assessment/Plan:  1. Skin lesion  - Ambulatory Referral to Dermatology    2. Weakness  - Ambulatory Referral to Home Health  - Multiple Vitamins-Minerals (MULTIVITAMIN WITH MINERALS) tablet; Take 1 tablet by mouth Daily.  Dispense: 30 tablet; Refill: 11    3. Underweight  - Ambulatory Referral to Nutrition Services        Return in about 3 months (around 10/2/2020) for Recheck, Next scheduled follow up. unless patient needs to be seen sooner or acute issues arise.    Code Status: Full    I have discussed the patient results/orders and and plan/recommendation with them at today's visit.      Love Cordon, DO   07/02/2020

## 2020-08-27 NOTE — PROGRESS NOTES
Subjective     Chief Complaint   Patient presents with   • Fatigue       History of Present Illness  Patient states that her BP is usually good.   Asked her to get a home monitor.   Patient complains of not having enough energy. States that she is not a good patient and doesn't like to be sick. States that she doesn't have any pains. 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   Patient has forgotten when her last B12 injection was. Tells me that she has lost 20 lbs, but overall weight since March appears stable.     For breakfast she had oatmeal and two red's doughnuts. Lunch had 4 lemon cookies and a tomato. Eating at restaurant tonight.   Still doing the Ensure shakes. Just got three big cases. Has two a day.     Patient states that she wears her 02 at night and occasionally during the day of her 02 drops below 93%.    Patient's PMR from outside medical facility reviewed and noted.    Review of Systems   Constitutional: Positive for fever. Negative for chills and fatigue.   HENT: Negative for congestion and rhinorrhea.    Respiratory: Positive for shortness of breath. Negative for cough.         SOA with activity   Cardiovascular: Negative for chest pain and leg swelling.   Gastrointestinal: Negative for blood in stool and constipation.   Genitourinary: Negative for dysuria and hematuria.   Musculoskeletal: Positive for gait problem. Negative for back pain.   Skin: Positive for color change. Negative for wound.   Neurological: Positive for weakness. Negative for headaches.   Psychiatric/Behavioral: Negative for sleep disturbance. The patient is not nervous/anxious.         Otherwise complete ROS reviewed and negative except as mentioned in the HPI.    Past Medical History:   Past Medical History:   Diagnosis Date   • Broken arm     left   • Hypertension      Past Surgical History:  Past Surgical History:   Procedure Laterality Date   •  "CHOLECYSTECTOMY     • FRACTURE SURGERY Left 2019    arm   • HYSTERECTOMY       Social History:  reports that she has never smoked. She has never used smokeless tobacco. She reports that she drinks alcohol. She reports that she does not use drugs.     Family History: family history includes Cancer in her sister; Heart attack in her brother; Lung disease in her father.       Allergies:  No Known Allergies     Medications:  Prior to Admission medications    Medication Sig Start Date End Date Taking? Authorizing Provider   apixaban (ELIQUIS) 2.5 MG tablet tablet Take 1 tablet by mouth Every 12 (Twelve) Hours. 7/21/20  Yes Love Cordon DO   atenolol (TENORMIN) 50 MG tablet Take 1 tablet by mouth Daily. 3/10/20  Yes Love Cordon DO   docusate sodium (COLACE) 100 MG capsule Take 100 mg by mouth Daily. 10/3/19  Yes Juan Mo MD   megestrol (MEGACE) 40 MG tablet Take 40 mg by mouth Every Morning. 10/3/19  Yes Juan Mo MD   Multiple Vitamins-Minerals (MULTIVITAMIN WITH MINERALS) tablet Take 1 tablet by mouth Daily. 7/2/20 7/2/21 Yes Love Cordon DO   mupirocin (BACTROBAN) 2 % ointment APPLY THREE TIMES DAILY 5/22/20  Yes Juan Mo MD   timolol (TIMOPTIC-XR) 0.5 % ophthalmic gel-forming Administer 0.5 drops to both eyes Daily. 6/4/20  Yes Juan Mo MD   vitamin D (ERGOCALCIFEROL) 1.25 MG (74936 UT) capsule capsule Take 1 capsule by mouth 1 (One) Time Per Week. 3/9/20  Yes Love Cordon DO   vitamin D (ERGOCALCIFEROL) 48677 units capsule capsule Take 50,000 Units by mouth 1 (One) Time Per Week. 10/3/19  Yes Juan Mo MD       Objective     Vital Signs: BP (!) 204/91 (BP Location: Left arm, Patient Position: Sitting, Cuff Size: Small Adult)   Pulse 84   Temp 98.7 °F (37.1 °C) (Infrared)   Ht 165.1 cm (65\")   Wt 45.8 kg (101 lb)   SpO2 92%   Breastfeeding No   BMI 16.81 kg/m²   Physical Exam   Constitutional:   Thin and frail   HENT:   "   Head: Normocephalic and atraumatic.   Nose: Nose normal.   Mouth/Throat: Oropharynx is clear and moist.   Eyes: Conjunctivae and EOM are normal.   Neck: Normal range of motion. Neck supple.   Cardiovascular: Normal rate, regular rhythm and normal heart sounds.   Pulmonary/Chest: Effort normal and breath sounds normal.   Abdominal: Soft. Bowel sounds are normal.   Musculoskeletal: She exhibits no edema or tenderness.   Neurological: She is alert. No cranial nerve deficit.   Skin: Skin is warm and dry.   Psychiatric: She has a normal mood and affect.   Vitals reviewed.      Patient's Body mass index is 16.81 kg/m². BMI is below normal parameters. Recommendations include: treating the underlying disease process.      Results Reviewed:  Glucose   Date Value Ref Range Status   09/23/2019 104 (H) 65 - 99 mg/dL Final     BUN   Date Value Ref Range Status   03/02/2020 15 10 - 36 mg/dL Final   09/23/2019 12 8 - 23 mg/dL Final     Creatinine   Date Value Ref Range Status   03/02/2020 0.73 0.57 - 1.00 mg/dL Final   09/23/2019 0.42 (L) 0.57 - 1.00 mg/dL Final     Sodium   Date Value Ref Range Status   03/02/2020 147 (H) 134 - 144 mmol/L Final   09/23/2019 143 136 - 145 mmol/L Final     Potassium   Date Value Ref Range Status   03/02/2020 5.2 3.5 - 5.2 mmol/L Final   09/23/2019 3.9 3.5 - 5.2 mmol/L Final     Chloride   Date Value Ref Range Status   03/02/2020 106 96 - 106 mmol/L Final   09/23/2019 104 98 - 107 mmol/L Final     CO2   Date Value Ref Range Status   09/23/2019 31.0 (H) 22.0 - 29.0 mmol/L Final     Total CO2   Date Value Ref Range Status   03/02/2020 24 20 - 29 mmol/L Final     Calcium   Date Value Ref Range Status   03/02/2020 9.7 8.7 - 10.3 mg/dL Final   09/23/2019 9.1 8.2 - 9.6 mg/dL Final     ALT (SGPT)   Date Value Ref Range Status   03/02/2020 12 0 - 32 IU/L Final   09/20/2019 5 1 - 33 U/L Final     AST (SGOT)   Date Value Ref Range Status   03/02/2020 17 0 - 40 IU/L Final   09/20/2019 11 1 - 32 U/L Final      WBC   Date Value Ref Range Status   03/02/2020 7.1 3.4 - 10.8 x10E3/uL Final     Hematocrit   Date Value Ref Range Status   03/02/2020 47.0 (H) 34.0 - 46.6 % Final   09/23/2019 38.7 34.0 - 46.6 % Final     Platelets   Date Value Ref Range Status   03/02/2020 510 (H) 150 - 450 x10E3/uL Final   09/23/2019 270 140 - 450 10*3/mm3 Final     Total Cholesterol   Date Value Ref Range Status   09/20/2019 132 0 - 200 mg/dL Final     Triglycerides   Date Value Ref Range Status   09/20/2019 78 0 - 150 mg/dL Final     HDL Cholesterol   Date Value Ref Range Status   09/20/2019 49 40 - 60 mg/dL Final     LDL Cholesterol    Date Value Ref Range Status   09/20/2019 67 0 - 100 mg/dL Final     LDL/HDL Ratio   Date Value Ref Range Status   09/20/2019 1.38  Final         Assessment / Plan     Assessment/Plan:  1. Fatigue, unspecified type  - CBC w AUTO Differential  - Comprehensive metabolic panel  - POCT urinalysis dipstick, automated  - cyanocobalamin injection 1,000 mcg    2. Unspecified severe protein-calorie malnutrition (CMS/HCC)   - Vitamin D 25 hydroxy        Return in about 3 months (around 11/27/2020) for Recheck, Next scheduled follow up. unless patient needs to be seen sooner or acute issues arise.    Code Status: Full    I have discussed the patient results/orders and and plan/recommendation with them at today's visit.      Love Cordon, DO   08/27/2020

## 2020-09-09 NOTE — PROGRESS NOTES
Subjective     Chief Complaint   Patient presents with   • Bleeding/Bruising     Left Leg/ Thigh       History of Present Illness  Pt comes in today with complaints of left thigh pain. Onset was Saturday night. States she couldn't hardly move her left leg because of pain. States she took 2 tylenol and went to bed. States she woke up and felt better. This am, her neighbor asked her what has happened on her left leg. She hadn't noticed but has developed a large bruised area with associated edema. Currently she is in no pain. States her breathing is short with ambulation. She is eating well. States she eats all the time. She is on Eliquis for chronic PE. Denies any falls recently. States she does not check her blood pressure at home. She does not have her blood pressure cuff.     Review of Systems   Otherwise complete ROS reviewed and negative    Past Medical History:   Past Medical History:   Diagnosis Date   • Broken arm     left   • Hypertension      Past Surgical History:  Past Surgical History:   Procedure Laterality Date   • CHOLECYSTECTOMY     • FRACTURE SURGERY Left 2019    arm   • HYSTERECTOMY       Social History:  reports that she has never smoked. She has never used smokeless tobacco. She reports that she drinks alcohol. She reports that she does not use drugs.    Family History: family history includes Cancer in her sister; Heart attack in her brother; Lung disease in her father.      Allergies:  No Known Allergies  Medications:  Prior to Admission medications    Medication Sig Start Date End Date Taking? Authorizing Provider   apixaban (ELIQUIS) 2.5 MG tablet tablet Take 1 tablet by mouth Every 12 (Twelve) Hours. 7/21/20   Love Cordon,    atenolol (TENORMIN) 50 MG tablet Take 1 tablet by mouth Daily. 3/10/20   Love Cordon DO   docusate sodium (COLACE) 100 MG capsule Take 100 mg by mouth Daily. 10/3/19   Provider, MD Juan   megestrol (MEGACE) 40 MG tablet Take 40 mg by mouth  "Every Morning. 10/3/19   Juan Mo MD   Multiple Vitamins-Minerals (MULTIVITAMIN WITH MINERALS) tablet Take 1 tablet by mouth Daily. 7/2/20 7/2/21  Love Cordon DO   mupirocin (BACTROBAN) 2 % ointment APPLY THREE TIMES DAILY 5/22/20   Juan Mo MD   timolol (TIMOPTIC-XR) 0.5 % ophthalmic gel-forming Administer 0.5 drops to both eyes Daily. 6/4/20   Juan Mo MD   vitamin D (ERGOCALCIFEROL) 1.25 MG (00965 UT) capsule capsule Take 1 capsule by mouth 1 (One) Time Per Week. 3/9/20   Love Cordon DO   vitamin D (ERGOCALCIFEROL) 63054 units capsule capsule Take 50,000 Units by mouth 1 (One) Time Per Week. 10/3/19   Juan Mo MD       Objective     Vital Signs: BP (!) 188/89 (BP Location: Right arm, Patient Position: Sitting, Cuff Size: Pediatric)   Pulse 87   Temp 98.2 °F (36.8 °C) (Skin)   Ht 165.1 cm (65\")   Wt 46 kg (101 lb 8 oz)   SpO2 94%   Breastfeeding No   BMI 16.89 kg/m²   Physical Exam   HENT:   Head: Normocephalic and atraumatic.   Eyes: Pupils are equal, round, and reactive to light. EOM are normal.   Cardiovascular: Normal rate and regular rhythm.   Pulmonary/Chest: Effort normal.   Abdominal: Soft.   Musculoskeletal: She exhibits edema ( left inner thigh.). She exhibits no tenderness.   Skin: Skin is warm. Capillary refill takes less than 2 seconds.   Very large bruised area left posterior thigh. I encompasses her entire hamstring area. Edema noted to the left inner thigh. Pulses doppled at 3+ PT 2+.        Results Reviewed:  Glucose   Date Value Ref Range Status   09/23/2019 104 (H) 65 - 99 mg/dL Final     BUN   Date Value Ref Range Status   08/27/2020 11 10 - 36 mg/dL Final   09/23/2019 12 8 - 23 mg/dL Final     Creatinine   Date Value Ref Range Status   08/27/2020 0.63 0.57 - 1.00 mg/dL Final   09/23/2019 0.42 (L) 0.57 - 1.00 mg/dL Final     Sodium   Date Value Ref Range Status   08/27/2020 145 (H) 134 - 144 mmol/L Final   09/23/2019 143 " 136 - 145 mmol/L Final     Potassium   Date Value Ref Range Status   08/27/2020 4.8 3.5 - 5.2 mmol/L Final   09/23/2019 3.9 3.5 - 5.2 mmol/L Final     Chloride   Date Value Ref Range Status   08/27/2020 102 96 - 106 mmol/L Final   09/23/2019 104 98 - 107 mmol/L Final     CO2   Date Value Ref Range Status   09/23/2019 31.0 (H) 22.0 - 29.0 mmol/L Final     Total CO2   Date Value Ref Range Status   08/27/2020 30 (H) 20 - 29 mmol/L Final     Calcium   Date Value Ref Range Status   08/27/2020 9.9 8.7 - 10.3 mg/dL Final   09/23/2019 9.1 8.2 - 9.6 mg/dL Final     ALT (SGPT)   Date Value Ref Range Status   08/27/2020 11 0 - 32 IU/L Final   09/20/2019 5 1 - 33 U/L Final     AST (SGOT)   Date Value Ref Range Status   08/27/2020 16 0 - 40 IU/L Final   09/20/2019 11 1 - 32 U/L Final     WBC   Date Value Ref Range Status   08/27/2020 8.3 3.4 - 10.8 x10E3/uL Final     Hematocrit   Date Value Ref Range Status   08/27/2020 50.1 (H) 34.0 - 46.6 % Final   09/23/2019 38.7 34.0 - 46.6 % Final     Platelets   Date Value Ref Range Status   08/27/2020 427 150 - 450 x10E3/uL Final   09/23/2019 270 140 - 450 10*3/mm3 Final     Total Cholesterol   Date Value Ref Range Status   09/20/2019 132 0 - 200 mg/dL Final     Triglycerides   Date Value Ref Range Status   09/20/2019 78 0 - 150 mg/dL Final     HDL Cholesterol   Date Value Ref Range Status   09/20/2019 49 40 - 60 mg/dL Final     LDL Cholesterol    Date Value Ref Range Status   09/20/2019 67 0 - 100 mg/dL Final     LDL/HDL Ratio   Date Value Ref Range Status   09/20/2019 1.38  Final         Assessment / Plan     Assessment/Plan:  Grace was seen today for bleeding/bruising.    Diagnoses and all orders for this visit:    Hematoma of left thigh, initial encounter  -     US Nonvascular Extremity Limited    Will continue to monitor for now.   She is to call back on Friday with an update.   Hold Eliquis for 48 hours.     No follow-ups on file. unless patient needs to be seen sooner or acute  issues arise.    I have discussed the patient results/orders and and plan/recommendation with them at today's visit.      Nneka Brown, PAULIE   09/09/2020

## 2020-09-14 NOTE — PROGRESS NOTES
Subjective     Chief Complaint   Patient presents with   • Fatigue   • Constipation       History of Present Illness  Left leg hematoma  Patient complains of decreased energy    Patient's PMR from outside medical facility reviewed and noted.    Review of Systems   Constitutional: Positive for fatigue. Negative for chills and fever.   HENT: Negative for congestion and rhinorrhea.    Respiratory: Negative for cough and shortness of breath.    Gastrointestinal: Positive for constipation. Negative for diarrhea, nausea and vomiting.   Genitourinary: Negative for dysuria and hematuria.   Skin: Positive for color change. Negative for wound.        Left leg hematoma        Otherwise complete ROS reviewed and negative except as mentioned in the HPI.    Past Medical History:   Past Medical History:   Diagnosis Date   • Broken arm     left   • Hypertension      Past Surgical History:  Past Surgical History:   Procedure Laterality Date   • CHOLECYSTECTOMY     • FRACTURE SURGERY Left 2019    arm   • HYSTERECTOMY       Social History:  reports that she has never smoked. She has never used smokeless tobacco. She reports current alcohol use. She reports that she does not use drugs.    Family History: family history includes Cancer in her sister; Heart attack in her brother; Lung disease in her father.       Allergies:  No Known Allergies  Medications:  Prior to Admission medications    Medication Sig Start Date End Date Taking? Authorizing Provider   atenolol (TENORMIN) 50 MG tablet Take 1 tablet by mouth Daily. 3/10/20  Yes Love Cordon DO   docusate sodium (COLACE) 100 MG capsule Take 100 mg by mouth Daily. 10/3/19  Yes ProviderJuan MD   megestrol (MEGACE) 40 MG tablet Take 40 mg by mouth Every Morning. 10/3/19  Yes Juan Mo MD   Multiple Vitamins-Minerals (MULTIVITAMIN WITH MINERALS) tablet Take 1 tablet by mouth Daily. 7/2/20 7/2/21 Yes Love Cordon DO   mupirocin (BACTROBAN) 2 %  "ointment APPLY THREE TIMES DAILY 5/22/20  Yes Juan Mo MD   timolol (TIMOPTIC-XR) 0.5 % ophthalmic gel-forming Administer 0.5 drops to both eyes Daily. 6/4/20  Yes Juan Mo MD   vitamin D (ERGOCALCIFEROL) 1.25 MG (95542 UT) capsule capsule Take 1 capsule by mouth 1 (One) Time Per Week. 3/9/20  Yes Love Cordon, DO   apixaban (ELIQUIS) 2.5 MG tablet tablet Take 1 tablet by mouth Every 12 (Twelve) Hours. 7/21/20   Love Cordon, DO   vitamin D (ERGOCALCIFEROL) 56958 units capsule capsule Take 50,000 Units by mouth 1 (One) Time Per Week. 10/3/19   ProviderJuan MD       Objective     Vital Signs: BP (!) 193/80 (BP Location: Left arm, Patient Position: Sitting, Cuff Size: Small Adult) Comment: said bp is up, due to riding with someone.(made her nervous)  Pulse 86   Temp 97.7 °F (36.5 °C) (Infrared)   Ht 165.1 cm (65\")   Wt 46.3 kg (102 lb)   SpO2 95%   Breastfeeding No   BMI 16.97 kg/m²   Physical Exam  Constitutional:       Appearance: Normal appearance.   HENT:      Head: Normocephalic and atraumatic.      Nose: Nose normal.      Mouth/Throat:      Mouth: Mucous membranes are moist.   Eyes:      General: No scleral icterus.     Extraocular Movements: Extraocular movements intact.   Cardiovascular:      Rate and Rhythm: Normal rate and regular rhythm.   Pulmonary:      Effort: Pulmonary effort is normal.      Breath sounds: No rhonchi.   Musculoskeletal: Normal range of motion.         General: No tenderness.   Skin:     Comments: Large left posterior thigh and medial lower leg hematoma.    Neurological:      General: No focal deficit present.      Mental Status: She is alert.      Cranial Nerves: No cranial nerve deficit.   Psychiatric:         Mood and Affect: Mood normal.         Behavior: Behavior normal.         Patient's Body mass index is 16.97 kg/m². BMI is below normal parameters. Recommendations include: treating the underlying disease process.      Results " Reviewed:  Glucose   Date Value Ref Range Status   09/23/2019 104 (H) 65 - 99 mg/dL Final     BUN   Date Value Ref Range Status   08/27/2020 11 10 - 36 mg/dL Final   09/23/2019 12 8 - 23 mg/dL Final     Creatinine   Date Value Ref Range Status   08/27/2020 0.63 0.57 - 1.00 mg/dL Final   09/23/2019 0.42 (L) 0.57 - 1.00 mg/dL Final     Sodium   Date Value Ref Range Status   08/27/2020 145 (H) 134 - 144 mmol/L Final   09/23/2019 143 136 - 145 mmol/L Final     Potassium   Date Value Ref Range Status   08/27/2020 4.8 3.5 - 5.2 mmol/L Final   09/23/2019 3.9 3.5 - 5.2 mmol/L Final     Chloride   Date Value Ref Range Status   08/27/2020 102 96 - 106 mmol/L Final   09/23/2019 104 98 - 107 mmol/L Final     CO2   Date Value Ref Range Status   09/23/2019 31.0 (H) 22.0 - 29.0 mmol/L Final     Total CO2   Date Value Ref Range Status   08/27/2020 30 (H) 20 - 29 mmol/L Final     Calcium   Date Value Ref Range Status   08/27/2020 9.9 8.7 - 10.3 mg/dL Final   09/23/2019 9.1 8.2 - 9.6 mg/dL Final     ALT (SGPT)   Date Value Ref Range Status   08/27/2020 11 0 - 32 IU/L Final   09/20/2019 5 1 - 33 U/L Final     AST (SGOT)   Date Value Ref Range Status   08/27/2020 16 0 - 40 IU/L Final   09/20/2019 11 1 - 32 U/L Final     WBC   Date Value Ref Range Status   08/27/2020 8.3 3.4 - 10.8 x10E3/uL Final     Hematocrit   Date Value Ref Range Status   08/27/2020 50.1 (H) 34.0 - 46.6 % Final   09/23/2019 38.7 34.0 - 46.6 % Final     Platelets   Date Value Ref Range Status   08/27/2020 427 150 - 450 x10E3/uL Final   09/23/2019 270 140 - 450 10*3/mm3 Final     Total Cholesterol   Date Value Ref Range Status   09/20/2019 132 0 - 200 mg/dL Final     Triglycerides   Date Value Ref Range Status   09/20/2019 78 0 - 150 mg/dL Final     HDL Cholesterol   Date Value Ref Range Status   09/20/2019 49 40 - 60 mg/dL Final     LDL Cholesterol    Date Value Ref Range Status   09/20/2019 67 0 - 100 mg/dL Final     LDL/HDL Ratio   Date Value Ref Range Status      09/20/2019 1.38  Final         Assessment / Plan     Assessment/Plan:  1. Hematoma  - CBC w AUTO Differential    2. Hypertension  - Encouraged patient to get a BP monitor at home to follow. Concern for ongoing elevated BPs.     3. Constipation  - Lactulose     Return for Recheck, Next scheduled follow up. unless patient needs to be seen sooner or acute issues arise.    Code Status:DNR. Patient states that she has an advanced directive with her grandson Neal Blackwell.     I have discussed the patient results/orders and and plan/recommendation with them at today's visit.      Love Cordon, DO   09/14/2020

## 2020-10-01 NOTE — PROGRESS NOTES
"        Subjective     Chief Complaint   Patient presents with   • Bleeding/Bruising     follow up of Hematoma       History of Present Illness  She states, \"I keep eating, but I am not gaining any weight.\" \"Where is my engine?\"  She asked about boost drinks.   Drove here today.  States her leg and bruising is much better. Would like the results of her ultrasound.     Going next week to the dermatologist for skin lesion.    Patient's PMR from outside medical facility reviewed and noted.    Review of Systems   Constitutional: Positive for fatigue. Negative for activity change.   HENT: Negative for congestion, rhinorrhea and sore throat.    Respiratory: Negative for cough, chest tightness and shortness of breath.         Using oxygen at night   Cardiovascular: Negative for chest pain, palpitations and leg swelling.   Gastrointestinal: Negative for constipation and diarrhea.   Genitourinary: Negative for difficulty urinating, dysuria and urgency.   Musculoskeletal: Negative for back pain and myalgias.   Neurological: Negative for syncope, weakness and headaches.   Hematological: Bruises/bleeds easily.        On blood thinners     Psychiatric/Behavioral: Negative for sleep disturbance.      Otherwise complete ROS reviewed and negative except as mentioned in the HPI.    Past Medical History:   Past Medical History:   Diagnosis Date   • Broken arm     left   • Hypertension      Past Surgical History:  Past Surgical History:   Procedure Laterality Date   • CHOLECYSTECTOMY     • FRACTURE SURGERY Left 2019    arm   • HYSTERECTOMY       Social History:  reports that she has never smoked. She has never used smokeless tobacco. She reports current alcohol use. She reports that she does not use drugs.    Family History: family history includes Cancer in her sister; Heart attack in her brother; Lung disease in her father.       Allergies:  No Known Allergies  Medications:  Prior to Admission medications    Medication Sig Start " "Date End Date Taking? Authorizing Provider   apixaban (ELIQUIS) 2.5 MG tablet tablet Take 1 tablet by mouth Every 12 (Twelve) Hours. 7/21/20  Yes Love Cordon DO   atenolol (TENORMIN) 50 MG tablet Take 1 tablet by mouth Daily. 3/10/20  Yes Love Cordon DO   docusate sodium (COLACE) 100 MG capsule Take 100 mg by mouth Daily. 10/3/19  Yes Juan Mo MD   lactulose (CHRONULAC) 10 GM/15ML solution Take 30 mL by mouth At Night As Needed (Constipation). 9/14/20  Yes Love Cordon DO   megestrol (MEGACE) 40 MG tablet Take 40 mg by mouth Every Morning. 10/3/19  Yes Juan Mo MD   Multiple Vitamins-Minerals (MULTIVITAMIN WITH MINERALS) tablet Take 1 tablet by mouth Daily. 7/2/20 7/2/21 Yes Love Cordon DO   mupirocin (BACTROBAN) 2 % ointment APPLY THREE TIMES DAILY 5/22/20  Yes Juan Mo MD   timolol (TIMOPTIC-XR) 0.5 % ophthalmic gel-forming Administer 0.5 drops to both eyes Daily. 6/4/20  Yes Juan Mo MD   vitamin D (ERGOCALCIFEROL) 1.25 MG (91527 UT) capsule capsule Take 1 capsule by mouth 1 (One) Time Per Week. 3/9/20  Yes Love Cordon DO   vitamin D (ERGOCALCIFEROL) 17010 units capsule capsule Take 50,000 Units by mouth 1 (One) Time Per Week. 10/3/19  Yes Juan Mo MD       Objective     Vital Signs: BP (!) 183/85 (BP Location: Left arm, Patient Position: Sitting, Cuff Size: Small Adult)   Pulse 80   Temp 97.6 °F (36.4 °C) (Infrared)   Ht 165.1 cm (65\")   Wt 45.8 kg (101 lb)   SpO2 95%   Breastfeeding No   BMI 16.81 kg/m²   Physical Exam  Constitutional:       Appearance: Normal appearance.   HENT:      Head: Normocephalic and atraumatic.   Eyes:      Pupils: Pupils are equal, round, and reactive to light.     Neck:      Musculoskeletal: Normal range of motion and neck supple.   Cardiovascular:      Rate and Rhythm: Normal rate and regular rhythm.      Pulses: Normal pulses.      Heart sounds: Normal heart sounds.   "   Pulmonary:      Effort: Pulmonary effort is normal.      Breath sounds: Normal breath sounds.   Abdominal:      General: Abdomen is flat. Bowel sounds are normal.      Palpations: Abdomen is soft.   Skin:     General: Skin is warm and dry.      Capillary Refill: Capillary refill takes less than 2 seconds.      Findings: Bruising and lesion present.             Comments: Left lower leg bruising, much improved   Neurological:      General: No focal deficit present.      Mental Status: She is alert and oriented to person, place, and time.   Psychiatric:         Mood and Affect: Mood normal.         Behavior: Behavior normal.         Thought Content: Thought content normal.         Judgment: Judgment normal.         Patient's Body mass index is 16.81 kg/m². BMI is below normal parameters. Recommendations include: nutritional supplement.      Results Reviewed:  Glucose   Date Value Ref Range Status   09/23/2019 104 (H) 65 - 99 mg/dL Final     BUN   Date Value Ref Range Status   08/27/2020 11 10 - 36 mg/dL Final   09/23/2019 12 8 - 23 mg/dL Final     Creatinine   Date Value Ref Range Status   08/27/2020 0.63 0.57 - 1.00 mg/dL Final   09/23/2019 0.42 (L) 0.57 - 1.00 mg/dL Final     Sodium   Date Value Ref Range Status   08/27/2020 145 (H) 134 - 144 mmol/L Final   09/23/2019 143 136 - 145 mmol/L Final     Potassium   Date Value Ref Range Status   08/27/2020 4.8 3.5 - 5.2 mmol/L Final   09/23/2019 3.9 3.5 - 5.2 mmol/L Final     Chloride   Date Value Ref Range Status   08/27/2020 102 96 - 106 mmol/L Final   09/23/2019 104 98 - 107 mmol/L Final     CO2   Date Value Ref Range Status   09/23/2019 31.0 (H) 22.0 - 29.0 mmol/L Final     Total CO2   Date Value Ref Range Status   08/27/2020 30 (H) 20 - 29 mmol/L Final     Calcium   Date Value Ref Range Status   08/27/2020 9.9 8.7 - 10.3 mg/dL Final   09/23/2019 9.1 8.2 - 9.6 mg/dL Final     ALT (SGPT)   Date Value Ref Range Status   08/27/2020 11 0 - 32 IU/L Final   09/20/2019 5 1  - 33 U/L Final     AST (SGOT)   Date Value Ref Range Status   08/27/2020 16 0 - 40 IU/L Final   09/20/2019 11 1 - 32 U/L Final     WBC   Date Value Ref Range Status   09/14/2020 8.9 3.4 - 10.8 x10E3/uL Final     Hematocrit   Date Value Ref Range Status   09/14/2020 44.9 34.0 - 46.6 % Final   09/23/2019 38.7 34.0 - 46.6 % Final     Platelets   Date Value Ref Range Status   09/14/2020 441 150 - 450 x10E3/uL Final   09/23/2019 270 140 - 450 10*3/mm3 Final     Total Cholesterol   Date Value Ref Range Status   09/20/2019 132 0 - 200 mg/dL Final     Triglycerides   Date Value Ref Range Status   09/20/2019 78 0 - 150 mg/dL Final     HDL Cholesterol   Date Value Ref Range Status   09/20/2019 49 40 - 60 mg/dL Final     LDL Cholesterol    Date Value Ref Range Status   09/20/2019 67 0 - 100 mg/dL Final     LDL/HDL Ratio   Date Value Ref Range Status   09/20/2019 1.38  Final         Assessment / Plan     Assessment/Plan:  1. Essential hypertension  -continue atenolol, not willing to take any other therapies at this time.     2. Hematoma  -Improved/resolved    3. B12 deficiency  -cyanocobalamin injection 1,000 mcg      Return in about 3 months (around 1/1/2021). unless patient needs to be seen sooner or acute issues arise.    Code Status: DNR, katya De Jesus JrMelissa Has advanced directive    I have discussed the patient results/orders and and plan/recommendation with them at today's visit.      Love Cordon, DO   10/01/2020

## 2020-11-16 NOTE — PROGRESS NOTES
Subjective     Chief Complaint   Patient presents with   • Fatigue       History of Present Illness  I just don't have enough energy. Weakness. Wears out easily.   Lost weight. ( one LB) States that she eats everything.     About 2 days ago, she doesn't have any energy. Difficult time getting up out of the chair. Holding on to things in the house to get around.     States that she feels like she is urinating too much, but unable to urinate in the office today.     A couple nights ago worried about her sister and thought she had locked her out of the house but she is .     Has not been moving her bowels right. Took some lactulose and states that she had stool incontinence.     At this time, she states that she is willing to try another BP medication.     Patient's PMR from outside medical facility reviewed and noted.    Review of Systems   Constitutional: Positive for fatigue. Negative for chills and fever.   HENT: Negative for congestion and rhinorrhea.    Respiratory: Negative for cough and shortness of breath.    Cardiovascular: Negative for chest pain and leg swelling.   Gastrointestinal: Positive for constipation. Negative for diarrhea and vomiting.   Genitourinary: Negative for dysuria and hematuria.   Neurological: Positive for weakness.        Confusion      Otherwise complete ROS reviewed and negative except as mentioned in the HPI.    Past Medical History:   Past Medical History:   Diagnosis Date   • Broken arm     left   • Hypertension      Past Surgical History:  Past Surgical History:   Procedure Laterality Date   • CHOLECYSTECTOMY     • FRACTURE SURGERY Left 2019    arm   • HYSTERECTOMY       Social History:  reports that she has never smoked. She has never used smokeless tobacco. She reports current alcohol use. She reports that she does not use drugs.    Family History: family history includes Cancer in her sister; Heart attack in her brother; Lung disease in her father.        "    Allergies:  No Known Allergies  Medications:  Prior to Admission medications    Medication Sig Start Date End Date Taking? Authorizing Provider   apixaban (ELIQUIS) 2.5 MG tablet tablet Take 1 tablet by mouth Every 12 (Twelve) Hours. 7/21/20  Yes Love Cordon DO   atenolol (TENORMIN) 50 MG tablet Take 1 tablet by mouth Daily. 10/1/20  Yes Love Cordon DO   docusate sodium (COLACE) 100 MG capsule Take 100 mg by mouth Daily. 10/3/19  Yes Juan Mo MD   lactulose (CHRONULAC) 10 GM/15ML solution Take 30 mL by mouth At Night As Needed (Constipation). 9/14/20  Yes Love Cordon DO   megestrol (MEGACE) 40 MG tablet Take 40 mg by mouth Every Morning. 10/3/19  Yes Juan Mo MD   Multiple Vitamins-Minerals (MULTIVITAMIN WITH MINERALS) tablet Take 1 tablet by mouth Daily. 7/2/20 7/2/21 Yes Love Cordon DO   mupirocin (BACTROBAN) 2 % ointment APPLY THREE TIMES DAILY 5/22/20  Yes Juan Mo MD   timolol (TIMOPTIC-XR) 0.5 % ophthalmic gel-forming Administer 0.5 drops to both eyes Daily. 6/4/20  Yes Juan Mo MD   vitamin D (ERGOCALCIFEROL) 1.25 MG (62594 UT) capsule capsule Take 1 capsule by mouth 1 (One) Time Per Week. 3/9/20  Yes Love Cordon DO   vitamin D (ERGOCALCIFEROL) 10365 units capsule capsule Take 50,000 Units by mouth 1 (One) Time Per Week. 10/3/19  Yes Juan Mo MD       Objective     Vital Signs: BP (!) 186/92 (BP Location: Left arm, Patient Position: Sitting, Cuff Size: Adult)   Pulse 71   Temp 97 °F (36.1 °C) (Infrared)   Ht 165.1 cm (65\")   Wt 45.4 kg (100 lb)   SpO2 95%   BMI 16.64 kg/m²   Physical Exam  Vitals signs reviewed.   Constitutional:       Appearance: Normal appearance. She is ill-appearing.      Comments: Thin and frail   HENT:      Head: Normocephalic and atraumatic.      Nose: Nose normal. No rhinorrhea.      Mouth/Throat:      Mouth: Mucous membranes are moist.      Pharynx: No posterior " oropharyngeal erythema.   Eyes:      General: No scleral icterus.     Conjunctiva/sclera: Conjunctivae normal.   Neck:      Musculoskeletal: Normal range of motion and neck supple.   Cardiovascular:      Rate and Rhythm: Normal rate and regular rhythm.      Heart sounds: Normal heart sounds.   Pulmonary:      Effort: Pulmonary effort is normal.      Breath sounds: Normal breath sounds.   Abdominal:      General: Bowel sounds are normal.      Palpations: Abdomen is soft.   Musculoskeletal:         General: No swelling or tenderness.      Comments: Generalized debility.    Skin:     General: Skin is warm and dry.   Neurological:      General: No focal deficit present.      Mental Status: She is alert.      Cranial Nerves: No cranial nerve deficit.   Psychiatric:         Mood and Affect: Mood normal.         Behavior: Behavior normal.       Patient's Body mass index is 16.64 kg/m². BMI is below normal parameters. Recommendations include: treating the underlying disease process.      Results Reviewed:  Glucose   Date Value Ref Range Status   09/23/2019 104 (H) 65 - 99 mg/dL Final     BUN   Date Value Ref Range Status   08/27/2020 11 10 - 36 mg/dL Final   09/23/2019 12 8 - 23 mg/dL Final     Creatinine   Date Value Ref Range Status   08/27/2020 0.63 0.57 - 1.00 mg/dL Final   09/23/2019 0.42 (L) 0.57 - 1.00 mg/dL Final     Sodium   Date Value Ref Range Status   08/27/2020 145 (H) 134 - 144 mmol/L Final   09/23/2019 143 136 - 145 mmol/L Final     Potassium   Date Value Ref Range Status   08/27/2020 4.8 3.5 - 5.2 mmol/L Final   09/23/2019 3.9 3.5 - 5.2 mmol/L Final     Chloride   Date Value Ref Range Status   08/27/2020 102 96 - 106 mmol/L Final   09/23/2019 104 98 - 107 mmol/L Final     CO2   Date Value Ref Range Status   09/23/2019 31.0 (H) 22.0 - 29.0 mmol/L Final     Total CO2   Date Value Ref Range Status   08/27/2020 30 (H) 20 - 29 mmol/L Final     Calcium   Date Value Ref Range Status   08/27/2020 9.9 8.7 - 10.3  mg/dL Final   09/23/2019 9.1 8.2 - 9.6 mg/dL Final     ALT (SGPT)   Date Value Ref Range Status   08/27/2020 11 0 - 32 IU/L Final   09/20/2019 5 1 - 33 U/L Final     AST (SGOT)   Date Value Ref Range Status   08/27/2020 16 0 - 40 IU/L Final   09/20/2019 11 1 - 32 U/L Final     WBC   Date Value Ref Range Status   09/14/2020 8.9 3.4 - 10.8 x10E3/uL Final     Hematocrit   Date Value Ref Range Status   09/14/2020 44.9 34.0 - 46.6 % Final   09/23/2019 38.7 34.0 - 46.6 % Final     Platelets   Date Value Ref Range Status   09/14/2020 441 150 - 450 x10E3/uL Final   09/23/2019 270 140 - 450 10*3/mm3 Final     Total Cholesterol   Date Value Ref Range Status   09/20/2019 132 0 - 200 mg/dL Final     Triglycerides   Date Value Ref Range Status   09/20/2019 78 0 - 150 mg/dL Final     HDL Cholesterol   Date Value Ref Range Status   09/20/2019 49 40 - 60 mg/dL Final     LDL Cholesterol    Date Value Ref Range Status   09/20/2019 67 0 - 100 mg/dL Final     LDL/HDL Ratio   Date Value Ref Range Status   09/20/2019 1.38  Final         Assessment / Plan     Assessment/Plan:  1. Fatigue, unspecified type  - POC Urinalysis Dipstick, Multipro  - XR Chest PA & Lateral (In Office)    2. Weakness  - POC Urinalysis Dipstick, Multipro  - XR Chest PA & Lateral (In Office)    3. Hypertension  - Procardia    4. Generalized debility  - Refer to home health.   - steroid, Decadron and kenalog    5. Concern for UTI  - Prophylactic treatment for UTI  - Rocephin      Flu shot and Vitamin B12 shot today.     Return in about 5 days (around 11/21/2020) for Recheck, Next scheduled follow up. unless patient needs to be seen sooner or acute issues arise.    Code Status: Full    I have discussed the patient results/orders and and plan/recommendation with them at today's visit.      Love Cordon, DO   11/16/2020

## 2020-11-17 NOTE — TELEPHONE ENCOUNTER
Called and spoke with pt,  She said [facility] picked up 2 rx's yesterday and [facility] delivered one today so she got all 3.

## 2020-11-17 NOTE — TELEPHONE ENCOUNTER
PT called and was thinking Dr. Cordon wanted her to take an extra medication, prescribing qty 3 although they only had qty 2 for pickup at pharmacy.  Please call PT back. Or [facility] [phone]

## 2020-11-21 NOTE — PROGRESS NOTES
Subjective     Chief Complaint   Patient presents with   • Weakness - Generalized     left arm, having weaknees from elbow up ,  right side she said she cant hang on to anything, and dropping things.    • Fatigue       History of Present Illness  89-year-old female who presents the office for follow-up on her lack of energy.  She had therapy come recently.  She states this morning she woke up and is now unable to move her left arm.  She is having to grab her coat sleeve in order to lift her arm up.  She is also having some left lower extremity weakness and some right hand weakness.  Multiple concerns.  Question TIA/strokelike symptoms.  Generalized debility.  Concern for rehab/nursing home placement secondary to Covid issues.    I called [facility] and attempt to get the patient admitted, was unable to do a direct admit, but made referral for the patient to go to the emergency department.  Spoke with Dr. Llanos.  Discussed stroke work-up, the patient's elevated blood pressure.  My hope is that the patient will be admitted to the hospital and have ongoing rehab in swing bed.  I think this will decrease her risk and exposure to Covid and hopefully improve her functional status.    Patient's PMR from outside medical facility reviewed and noted.    Review of Systems   Constitutional: Negative for chills and fatigue.   HENT: Negative for congestion and rhinorrhea.    Respiratory: Negative for cough and shortness of breath.    Cardiovascular: Negative for chest pain and leg swelling.   Gastrointestinal: Positive for constipation. Negative for diarrhea, nausea and vomiting.   Genitourinary: Negative for dysuria and hematuria.   Neurological: Positive for weakness. Negative for facial asymmetry.      Otherwise complete ROS reviewed and negative except as mentioned in the HPI.    Past Medical History:   Past Medical History:   Diagnosis Date   • Broken arm     left   • Hypertension      Past Surgical  History:  Past Surgical History:   Procedure Laterality Date   • CHOLECYSTECTOMY     • FRACTURE SURGERY Left 2019    arm   • HYSTERECTOMY       Social History:  reports that she has never smoked. She has never used smokeless tobacco. She reports current alcohol use. She reports that she does not use drugs.    Family History: family history includes Cancer in her sister; Heart attack in her brother; Lung disease in her father.       Allergies:  No Known Allergies  Medications:  Prior to Admission medications    Medication Sig Start Date End Date Taking? Authorizing Provider   apixaban (ELIQUIS) 2.5 MG tablet tablet Take 1 tablet by mouth Every 12 (Twelve) Hours. 11/16/20  Yes Love Cordon DO   atenolol (TENORMIN) 50 MG tablet Take 1 tablet by mouth Daily. 10/1/20  Yes Love Cordon DO   cefdinir (OMNICEF) 300 MG capsule Take 1 capsule by mouth 2 (Two) Times a Day. 11/16/20  Yes Lvoe Cordon DO   docusate sodium (COLACE) 100 MG capsule Take 100 mg by mouth Daily. 10/3/19  Yes Juan Mo MD   lactulose (CHRONULAC) 10 GM/15ML solution Take 30 mL by mouth At Night As Needed (Constipation). 9/14/20  Yes Love Cordon DO   megestrol (MEGACE) 40 MG tablet Take 40 mg by mouth Every Morning. 10/3/19  Yes Juan Mo MD   Multiple Vitamins-Minerals (MULTIVITAMIN WITH MINERALS) tablet Take 1 tablet by mouth Daily. 7/2/20 7/2/21 Yes Love Cordon DO   mupirocin (BACTROBAN) 2 % ointment APPLY THREE TIMES DAILY 5/22/20  Yes Juan Mo MD   NIFEdipine XL (Procardia XL) 60 MG 24 hr tablet Take 1 tablet by mouth Daily. 11/16/20  Yes Love Cordon DO   timolol (TIMOPTIC-XR) 0.5 % ophthalmic gel-forming Administer 0.5 drops to both eyes Daily. 6/4/20  Yes Juan Mo MD   vitamin D (ERGOCALCIFEROL) 1.25 MG (16972 UT) capsule capsule Take 1 capsule by mouth 1 (One) Time Per Week. 3/9/20  Yes Love Cordon DO   vitamin D (ERGOCALCIFEROL) 89345 units  "capsule capsule Take 50,000 Units by mouth 1 (One) Time Per Week. 10/3/19  Yes Provider, MD Juan       Objective     Vital Signs: /91 (BP Location: Left arm, Patient Position: Sitting, Cuff Size: Adult)   Pulse 82   Temp 97.7 °F (36.5 °C) (Temporal)   Ht 165.1 cm (65\")   Wt 45.4 kg (100 lb)   SpO2 95%   Breastfeeding No   BMI 16.64 kg/m²   Physical Exam  Vitals signs reviewed.   Constitutional:       Appearance: She is ill-appearing.   HENT:      Head: Normocephalic and atraumatic.      Nose: Nose normal. No rhinorrhea.   Eyes:      General: No scleral icterus.     Conjunctiva/sclera: Conjunctivae normal.   Neck:      Musculoskeletal: Normal range of motion and neck supple.   Cardiovascular:      Rate and Rhythm: Normal rate and regular rhythm.      Heart sounds: Normal heart sounds.   Pulmonary:      Effort: Pulmonary effort is normal.      Breath sounds: Normal breath sounds.   Abdominal:      General: Bowel sounds are normal.      Palpations: Abdomen is soft.   Musculoskeletal:         General: No tenderness.   Skin:     General: Skin is warm and dry.   Neurological:      Mental Status: She is alert.      Cranial Nerves: No cranial nerve deficit.      Comments: Unable to  Move her left arm without picking up her coat sleeve. LLE weakness.    Psychiatric:         Mood and Affect: Mood normal.         Behavior: Behavior normal.         Patient's Body mass index is 16.64 kg/m². BMI is below normal parameters. Recommendations include: treating the underlying disease process.      Results Reviewed:  Glucose   Date Value Ref Range Status   09/23/2019 104 (H) 65 - 99 mg/dL Final     BUN   Date Value Ref Range Status   08/27/2020 11 10 - 36 mg/dL Final   09/23/2019 12 8 - 23 mg/dL Final     Creatinine   Date Value Ref Range Status   08/27/2020 0.63 0.57 - 1.00 mg/dL Final   09/23/2019 0.42 (L) 0.57 - 1.00 mg/dL Final     Sodium   Date Value Ref Range Status   08/27/2020 145 (H) 134 - 144 mmol/L Final  "   09/23/2019 143 136 - 145 mmol/L Final     Potassium   Date Value Ref Range Status   08/27/2020 4.8 3.5 - 5.2 mmol/L Final   09/23/2019 3.9 3.5 - 5.2 mmol/L Final     Chloride   Date Value Ref Range Status   08/27/2020 102 96 - 106 mmol/L Final   09/23/2019 104 98 - 107 mmol/L Final     CO2   Date Value Ref Range Status   09/23/2019 31.0 (H) 22.0 - 29.0 mmol/L Final     Total CO2   Date Value Ref Range Status   08/27/2020 30 (H) 20 - 29 mmol/L Final     Calcium   Date Value Ref Range Status   08/27/2020 9.9 8.7 - 10.3 mg/dL Final   09/23/2019 9.1 8.2 - 9.6 mg/dL Final     ALT (SGPT)   Date Value Ref Range Status   08/27/2020 11 0 - 32 IU/L Final   09/20/2019 5 1 - 33 U/L Final     AST (SGOT)   Date Value Ref Range Status   08/27/2020 16 0 - 40 IU/L Final   09/20/2019 11 1 - 32 U/L Final     WBC   Date Value Ref Range Status   09/14/2020 8.9 3.4 - 10.8 x10E3/uL Final     Hematocrit   Date Value Ref Range Status   09/14/2020 44.9 34.0 - 46.6 % Final   09/23/2019 38.7 34.0 - 46.6 % Final     Platelets   Date Value Ref Range Status   09/14/2020 441 150 - 450 x10E3/uL Final   09/23/2019 270 140 - 450 10*3/mm3 Final     Total Cholesterol   Date Value Ref Range Status   09/20/2019 132 0 - 200 mg/dL Final     Triglycerides   Date Value Ref Range Status   09/20/2019 78 0 - 150 mg/dL Final     HDL Cholesterol   Date Value Ref Range Status   09/20/2019 49 40 - 60 mg/dL Final     LDL Cholesterol    Date Value Ref Range Status   09/20/2019 67 0 - 100 mg/dL Final     LDL/HDL Ratio   Date Value Ref Range Status   09/20/2019 1.38  Final         Assessment / Plan     Assessment/Plan:  1. Weakness of left upper extremity    2. Essential hypertension    3. Fatigue, unspecified type      Referral made to [facility], in hopes that the patient will get admitted and have swing bed rehab to improve her functional status at home.    Return if symptoms worsen or fail to improve, for Recheck, Next scheduled follow up. unless patient  needs to be seen sooner or acute issues arise.    Code Status: Full    I have discussed the patient results/orders and and plan/recommendation with them at today's visit.      Love Cordon, DO   11/21/2020

## 2020-11-24 PROBLEM — R91.8 MASS OF UPPER LOBE OF RIGHT LUNG: Status: ACTIVE | Noted: 2020-01-01

## 2020-11-25 PROBLEM — Z78.9 NON-SMOKER: Status: ACTIVE | Noted: 2020-01-01

## 2020-11-25 PROBLEM — C79.51 BONE METASTASES: Status: ACTIVE | Noted: 2020-01-01

## 2020-11-30 PROBLEM — Z74.09 IMMOBILITY: Status: RESOLVED | Noted: 2020-01-01 | Resolved: 2020-01-01

## 2020-11-30 PROBLEM — C79.51 SPINE METASTASIS: Status: ACTIVE | Noted: 2020-01-01

## 2020-11-30 PROBLEM — C79.31 BRAIN METASTASES: Status: ACTIVE | Noted: 2020-01-01

## 2020-11-30 PROBLEM — Z74.09 IMMOBILITY: Status: ACTIVE | Noted: 2020-01-01

## 2020-11-30 NOTE — PROGRESS NOTES
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 Reports the inability to move left arm and shoulder since about 1 week ago; can use her hand for grasping as long as she puts it wear she needs to. This started two years ago after she broke her shoulder but has progressively gotten worse recently. Denies recent fall or shoulder left shoulder trauma.  Denies pain anywhere.   She is here today with her grandson.    History of Present Illness:    11/21/2020 - Appointment with Love Cordon DO (Internal Medicine):  · 89-year-old female who presents the office for follow-up on her lack of energy.  She had therapy come recently.  She states this morning she woke up and is now unable to move her left arm.  She is having to grab her coat sleeve in order to lift her arm up.  She is also having some left lower extremity weakness and some right hand weakness.  · Multiple concerns. Question TIA/strokelike symptoms.  Generalized debility.  Concern for rehab/nursing home placement secondary to Covid issues.  · I called [facility] and attempt to get the patient admitted, was unable to do a direct admit, but made referral for the patient to go to the emergency department.  Spoke with Dr. Llanos.  Discussed stroke work-up, the patient's elevated blood pressure.  My hope is that the patient will be admitted to the  hospital and have ongoing rehab in swing bed.  I think this will decrease her risk and exposure to Covid and hopefully improve her functional status.  Recommendations:  · Referral made to [facility] ER, in hopes that the patient will get admitted and have swing bed rehab to improve her functional status at home.  · Return if symptoms worsen or fail to improve, for Recheck, Next scheduled follow up. unless patient needs to be seen sooner or acute issues arise.    11/21/2020 - CT Chest without contrast at [facility]:  · 2.5 cm spiculated mass RUL consistent with malignancy  · Chronic bilateral lower lobe atelectasis.   · Large lytic lesion involving T9 vertebral body with mild encroachment on thoracic cord    11/21/2020 - CT Abdomen/Pelvis without contrast at [facility]:  · Multiple low density liver masses consistent with metastatic disease  · Diverticulosis   ·   11/21/2020 -CT of the head without contrast:   *Probable bilateral metastasis with adjacent white matter edema.  The appearance of the brain has   worsened compared to recent exam of 10/21/2020   **There are 2 low-density areas in the right frontal and parietal regions measuring 12 mm and 18     mm in size with adjacent white matter edema; also some low-density in the left posterior parietal     cortex suspicious for metastatic disease.    Patient comes in for initial consultation in the company of her grandson.  She is able to walk without pain or gait abnormalities, however notes that she has been losing weight and strength the last few months.  The inability to move/abduct her left shoulder apparently was abrupt as of several days ago but states that she has been getting left shoulder weakness since her left shoulder fracture required hardware fixation several years ago.  He denies headaches, dizziness, nausea/vomiting, change in vision or sleep, or focal neurological deficits aside from left shoulder issues.  She denies /GI pattern  changes.  Patient reports being independent and until recently being able to perform all of her chores, now more difficult given inability to freely move her left upper extremity.  She has not been seen by medical oncology.    History obtained from  PATIENT, FAMILY and CHART    PAST MEDICAL HISTORY  Past Medical History:   Diagnosis Date   • Broken arm     left   • Hypertension       PAST SURGICAL HISTORY  Past Surgical History:   Procedure Laterality Date   • CHOLECYSTECTOMY     • FRACTURE SURGERY Left 2019    arm   • HYSTERECTOMY        ONCOLOGIC HISTORY: Patient denies previous history of malignancies, chemotherapy or radiation therapy.  She denies active rheumatologic disease or vascular/connective tissue disorders.    FAMILY HISTORY  family history includes Cancer in her sister; Heart attack in her brother; Lung disease in her father.     SOCIAL HISTORY  Social History     Tobacco Use   • Smoking status: Never Smoker   • Smokeless tobacco: Never Used   Substance Use Topics   • Alcohol use: Yes     Comment: social   • Drug use: No     ALLERGIES  Patient has no known allergies.     MEDICATIONS    Current Outpatient Medications:   •  apixaban (ELIQUIS) 2.5 MG tablet tablet, Take 1 tablet by mouth Every 12 (Twelve) Hours., Disp: 60 tablet, Rfl: 11  •  cefdinir (OMNICEF) 300 MG capsule, Take 1 capsule by mouth 2 (Two) Times a Day., Disp: 14 capsule, Rfl: 0  •  dexamethasone (DECADRON) 6 MG tablet, Take 1 tablet by mouth Daily With Breakfast for 30 days., Disp: 30 tablet, Rfl: 0  •  docusate sodium (COLACE) 100 MG capsule, Take 100 mg by mouth Daily., Disp: , Rfl: 0  •  lactulose (CHRONULAC) 10 GM/15ML solution, Take 30 mL by mouth At Night As Needed (Constipation)., Disp: 150 mL, Rfl: 0  •  megestrol (MEGACE) 40 MG tablet, Take 40 mg by mouth Every Morning., Disp: , Rfl: 0  •  Multiple Vitamins-Minerals (MULTIVITAMIN WITH MINERALS) tablet, Take 1 tablet by mouth Daily., Disp: 30 tablet, Rfl: 11  •  mupirocin  (BACTROBAN) 2 % ointment, APPLY THREE TIMES DAILY, Disp: , Rfl:   •  NIFEdipine XL (Procardia XL) 60 MG 24 hr tablet, Take 1 tablet by mouth Daily., Disp: 30 tablet, Rfl: 1  •  timolol (TIMOPTIC-XR) 0.5 % ophthalmic gel-forming, Administer 0.5 drops to both eyes Daily., Disp: , Rfl:   •  vitamin D (ERGOCALCIFEROL) 1.25 MG (81205 UT) capsule capsule, Take 1 capsule by mouth 1 (One) Time Per Week., Disp: 5 capsule, Rfl: 5  •  vitamin D (ERGOCALCIFEROL) 60314 units capsule capsule, Take 50,000 Units by mouth 1 (One) Time Per Week., Disp: , Rfl: 2  •  atenolol (TENORMIN) 50 MG tablet, Take 1 tablet by mouth Daily., Disp: 90 tablet, Rfl: 3  •  dexamethasone (DECADRON) 4 MG tablet, Take 1 tablet by mouth 2 (Two) Times a Day With Meals., Disp: 30 tablet, Rfl: 0    Current Facility-Administered Medications:   •  cyanocobalamin injection 1,000 mcg, 1,000 mcg, Intramuscular, Q28 Days, Love Cordno DO, 1,000 mcg at 10/01/20 1346    The following portions of the patient's history were reviewed and updated as appropriate: allergies, current medications, past family history, past medical history, past social history, past surgical history and problem list.    Current outpatient and discharge medications have been reconciled for the patient.  Reviewed by: August Bain MD    REVIEW OF SYSTEMS  Review of Systems   Constitutional: Positive for fatigue and unexpected weight change (decrease of 50 lbs over last year). Negative for appetite change.   HENT: Negative.    Eyes: Positive for visual disturbance.   Respiratory: Negative for cough and shortness of breath.         02@2L prn     Gastrointestinal: Positive for constipation. Negative for nausea and vomiting.   Endocrine: Negative.    Genitourinary: Positive for urgency (with occasional incontinence d/t left arm weakness).   Musculoskeletal: Negative for back pain.   Skin: Negative.    Allergic/Immunologic: Negative.    Neurological: Positive for weakness (left arm,  "progressively weak since arm surgery x 2 years ago, worsened over last week) and light-headedness (when standing too quickly). Negative for dizziness and headaches.   Hematological: Negative.    Psychiatric/Behavioral: Negative.      PHYSICAL EXAM  VITAL SIGNS:   Vitals:    11/30/20 1122   BP: 160/68   Weight: 43.5 kg (95 lb 14.4 oz)   Height: 165.1 cm (65\")   PainSc: 0-No pain      General:  In wheelchair, Alert and oriented, no acute distress, thin, Vitals reviewed.  Head:  Normocephalic, without obvious abnormality    Nose/Sinuses:  Nares normal externally, no sinus tenderness.  Mouth/Throat:  Mucosa moist, pharynx without erythema  Neck:  No evidence of adenopathy in the cervical or supraclavicular areas.  Eyes: No gross abnormalities   Ears: Ears intact with no external abnormalities noted  Chest:  Respiratory efforts are normal and unlabored, chest is clear to auscultation.  Cardiovascular: Regular rate and rhythm without murmurs, rubs, or gallops.   Abdomen:  Soft, non-tender, normal bowel sounds; no CVA tenderness   Extremities: Warm to touch, no evidence of cyanosis or edema.left arm weak/unable to move, is picking it up to place it where needed,  equal bilaterallly, fingers of both hands move freely.  Muscle skeletal: Fine examination reveals severe scoliosis without apparent paraspinal masses, point tenderness or overlying skin changes.  No specific tenderness to palpation of different spine levels or his sacral area.  Palpation of the left shoulder and left upper extremity do not reveal point tenderness, masses, evidence of trauma, bony deformity, or limitation to passive range of motion.  Skin: No suspicious lesions or rashes of concern  Neurologic:  Alert and oriented.    Psych: Mood and affect are appropriate    Performance Status: ECOG (3)     Clinical Quality Measures  Grace Parker reports a pain score of 0/10.  Given her pain assessment as noted, treatment options were discussed and the " following options were decided upon as a follow-up plan to address the patient's pain: no pain, no plan given.    -Advanced Care Planning    Advance Care Planning ACP discussion was held with the patient during this visit. Patient does not have an advance directive, information provided.    -Body Mass Index Screening and Follow-Up Plan Patient's Body mass index is 15.96 kg/m². BMI is within normal parameters. No follow-up required..  -Tobacco Use: Screening and Cessation Intervention Social History    Tobacco Use      Smoking status: Never Smoker      Smokeless tobacco: Never Used    ASSESSMENT AND PLAN  1. Mass of upper lobe of right lung    2. Spine metastasis (CMS/HCC)    3. Brain metastases (CMS/HCC)    4. Immobility of left arm/shoulder    5. Non-smoker    6. Weight loss      Orders Placed This Encounter   Procedures   • MRI Brain With & Without Contrast   • MRI Cervical Spine With & Without Contrast   • MRI Thoracic Spine With & Without Contrast   • CT Needle Biopsy Lung Right     Requested Prescriptions     Signed Prescriptions Disp Refills   • dexamethasone (DECADRON) 4 MG tablet 30 tablet 0     Sig: Take 1 tablet by mouth 2 (Two) Times a Day With Meals.       RECOMMENDATIONS: Grace Parker has been referred to our office today to discuss palliative radiotherapy recommendations for radiologic apparent metastatic disease without tissue confirmation but with clinical/radiologic scenario suggesting possible pulmonary primary as there is a right-sided pulmonary mass, likely stage IV (TX NX M1).   Patient presenting with approximately 7 to 10-day evolution of inability to move/abduct the left shoulder but remains with preserved hand grasping with no other neurological deficits or signs/symptoms of increased intracranial pressure although was started on Decadron 6 mg 1 tablet daily several days ago and patient cannot attest to clear improvement of her inability to move her left shoulder.  Disease burden includes a  2.5 cm spiculated right upper lobe lung mass, brain lesions, liver lesions, and bone lesions.  Additionally, on personal review of images a left adrenal mass and a right sacral lytic lesion was also noted.  She has not had proper brain or spine imaging with dedicated brain MRIs but on review of limited available imaging, no obvious culprit for left shoulder symptoms aside from possible motor center involvement of brain metastasis.  Patient states that she does not want to be cut on (very involved surgeries/very invasive procedures) or medication that can a make her sick.  She has not been evaluated by medical oncology and no tissue diagnosis has been made yet.    We have discussed the indications and rationale of radiation therapy according to the NCCN Guidelines in the setting of metastatic disease; merely a palliative role. I have extensively reviewed the risks, benefits and alternatives of therapy and progression of disease in spite of therapy with either local or systemic failure.  I have seen, examined and reviewed her medication list, appropriate labs and imaging studies as well as other physician notes. We discussed the goals/plans of care and answered all questions.  Suspecting that the localized left upper extremity neurological deficits may be a result of brain metastasis, and in an effort to deliver treatment quickly in hopes of achieving reversibility of symptoms, I discussed trying to proceed quickly with either palliative whole brain radiation therapy or intracranial SBRT understanding that proper SBRT may not be able to be delivered for many days to a couple of weeks depending on how quickly we can obtain additional work-up such as Stealth brain MRI in the setting of the pandemic and other patient centered factors.  She also requires MRIs of the cervical and thoracic spines to rule out an extracranial culprit lesion for her shoulder symptoms and to rule out additional spinal cord threatening lesions.   Her known T9 bony metastasis also shows encroachment upon the thoracic spinal cord and palliative radiation therapy should also proceed quickly in hopes of avoiding spinal cord compression.  For the time being, I will increase the Decadron dose to at least 4 mg twice daily and will wait for their decision to proceed with either palliative whole brain radiation therapy or intracranial SBRT plus thoracic spine palliative radiation therapy.  CT simulator was not available for RT planning today and she will be simulated as soon as possible.  If we are able to schedule a brain MRI in the next couple of days, I anticipate proceeding with intracranial SBRT otherwise in an effort to deliver treatment quickly in hopes of achieving reversibility of symptoms, will proceed with palliative whole brain radiation therapy.  Patient's questions were answered to their satisfaction and at the end of our discussion they wish to proceed with treatment as discussed.      After careful consideration of the diagnostic data and evaluation of the patient, palliative radiation therapy is recommended to the brain (intracranial SBRT versus whole brain radiation therapy as discussed above) and the thoracic spine, T8-T10 in hopes of avoiding asymptomatic spine event.     Additional work-up is needed and will be ordered: Stealth brain MRI, C-spine MRI, T-spine MRI; referral to interventional radiology for percutaneous biopsies of right lung mass or right sacral mass.  Patient will also require referral to medical oncology for systemic therapy options.    The patient and her family verbalize understanding of this discussion, voice no further questions and wish to proceed with recommendations.  We will simulate treatment fields tomorrow, CT sim is out of order today.     Thank you for allowing me to assist in this patients care.    Todays appointment time was spent in counseling and coordination of care as follows: diagnosis, intent of treatment  discussing radiation therapy specifics: logistics, possible and probable side effects and after effects, staging of cancer, standard of care in for this stage of this cancer and treatment options  August Bain MD   11/30/2020

## 2020-12-03 NOTE — PROGRESS NOTES
Adult Outpatient Nutrition  Assessment/PES    Patient Name:  Grace Parker  YOB: 1927  MRN: [id number]    Assessment Date:  12/3/2020    Comments:  I had the pleasure of meeting with Mrs. Parker today.  She has had unintentional wt loss with BMI now 14.9 based on stated weight today.  She denies any pain and no difficulty chewing/swallowing. She denies any difficulty having access to food, in fact talks about grilling her own steak, making own meals, in addition to having lots of meals/casseroles brought to her from friends/community members that are helping her.  She denies ever skipping meals, and in fact eats at least 3 meals/d and tells me she snacks consistently throughout the day.  Has been drinking Boost Plus BID.    Recommended increasing Boost Plus to TID and mixing with ice cream. Encouraged minimum three fruit servings daily, she does already eat several servings of vegetables daily.  We discussed ways to maintain adequate protein intake via food, including but not limited to appropriate portions of meat and having at least 1-2 servings of beans daily in addition to her supplements.      General Info     Row Name 12/03/20 1239       Today's Session    Person(s) attending today's session  Patient consent given by for telehealth visit via telephone       General Information    Preferred Language  English    Lives With  alone is close with neighbors/small community around her. Grandson is near. Son lives in [place], [place]    Oncology patient?  yes new diagnosis of metastatic cancer. it appears Pt will be starting pallative radiation therapy       Oncology Specific Assessment    Reported symptoms  Weight loss denies any pain, denies any difficulty eating        Physical Findings     Row Name 12/03/20 1241          Physical Findings    Overall Physical Appearance  underweight due to remote visit at this time, not able to visually assess physical appearance, anticipate the Pt does have  "muscle and fat wasting         Anthropometrics     Row Name 12/03/20 1242          Anthropometrics    Height  165.1 cm (65\")     Weight  40.8 kg (90 lb)        Ideal Body Weight (IBW)    Ideal Body Weight (IBW) (kg)  57.29     % Ideal Body Weight  71.26        Usual Body Weight (UBW)    Usual Body Weight  -- 130-140 most of adult life     Weight Loss  unintentional     Weight Loss Time Frame  Pt is reported 30 lbs lost in the last yr, however based on available wt report, loss of ~10-15 lbs (10-15%) in the past yr        Body Mass Index (BMI)    BMI (kg/m2)  15.01         Nutritional Info/Activity     Row Name 12/03/20 1246       Nutritional Information    Have you had weight changes?  Yes    Describe weight changes  unintentional wt loss    Have you tried to lose weight before?  No    Supplemental Drinks/Foods/Additives  Has been drinking 2 boost plus daily    History of eating disorder?  No    Do you have difficulty chewing food?  No    Functional Status  able to prepare meals;able to purchase food;ambulatory Pt reports she's been able to go buy food, however she also has several close friends/relatives that bring her food. 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  Pt reports she eats frequently throughout the day.    How many times do you drink milk per day?  0 doesn't really drink milk but does put it over cereal at brkfst    How many times do you eat fruit per day?  -- does usually have the dole fruit cups on hand, eats bananas    How many times do you eat vegetables per day?  -- serveral servings of vegetables daily; specifically usually has at least 1 serving of beans    How many times do you eat desserts per day?  -- due to Thanksgiving being last wk Pt reports she's had 2 pecan pies, however not sure if this is normal intake or just due to the recent holiday    " "How many times do you eat ice cream per day?  1 says she always has strawberry and butterpecan ice cream on hand    How many times do you have caffeine per day?  1 coffee with cream and sugar in the mornings    How many meals do you eat each day?  3    How many snacks do you eat each day?  -- states she's always eating throughout the day    What is the biggest challenge you have with your diet?  Weight maintenance    Enter everything you can remember eating in the last 24 hours (1 day)  not able to remember everything from yesteday but states today so far she's had coffee with cream and sugar, a sausage and cheese biscuit, 2 Reds doughnuts, some casseroles from friends, and has BBQ and baked beans ready for lunch.       Eating Environment    Eating environment  Alone;Family        Home Nutrition Report     Row Name 12/03/20 1257 12/03/20 1246       Home Nutrition Report    Fluid/Beverage Intake  Oral supplements used  --    Diet  No specific  --    Typical Food/Fluid Intake  Frequently eats seafood/fish, likes chicken or beef, if she doesn't have doughnuts in the morning will eat toast with PB and Jelly, also eats cereal and milk in mornings with a banana, loves vegetables, eats them frequently. Likes beans (baked beans, white beans, etc.) and usually has them daily.  --    Food Preferences  Gets lots of food from friends/family that bring it to her. She repeatedly tells me that she doesn't have an issue eating enough, she eats all day, she just continues to lose wt  --    Supplemental Drinks/Foods/Additives  --  Has been drinking 2 boost plus daily        Estimated/Assessed Needs     Row Name 12/03/20 1242          Calculation Measurements    Weight Used For Calculations  40.8 kg (90 lb)     Height  165.1 cm (65\")        Estimated/Assessed Needs    Additional Documentation  Fluid Requirements (Group);KCAL/KG (Group);Protein Requirements (Group)        KCAL/KG    KCAL/KG  35 Kcal/Kg (kcal)     35 Kcal/Kg (kcal)  " 1428.84        Protein Requirements    Weight Used For Protein Calculations  40.8 kg (90 lb)     Est Protein Requirement Amount (gms/kg)  1.5 gm protein     Estimated Protein Requirements (gms/day)  61.24        Fluid Requirements    Fluid Requirements (mL/day)  1428     Estimated Fluid Requirement Method  RDA Method     RDA Method (mL)  1428                 Problem/Interventions:  Problem 1     Row Name 12/03/20 1301          Nutrition Diagnoses Problem 1    Problem 1  Underweight     Etiology (related to)  Medical Diagnosis     Nutrition related  Increased nutrition needs catabolic nature of chronic diease; suspect high metabolic rate     Oncology  Cancer w/mets     Signs/Symptoms (evidenced by)  Unintended Weight Change;BMI;% IBW;% UBW     BMI  Less than 16     Percent (%) IBW  72 %     Percent (%) UBW  69.2 % of reported UBW at 130 lb     Unintended Weight Change  Loss     Number of Pounds Lost  reported 30 lbs in the past yr; wt record in EPIC noted likely closer to 10-15 lbs     Weight loss time period  1 yr                 Intervention Goal     Row Name 12/03/20 1316          Intervention Goal    General  Disease management/therapy;Meet nutritional needs for age/condition;Nutrition support treatment;Reduce/improve symptoms     PO  Increase intake;Meet estimated needs;Maintain intake increase nutrient density of intake     Weight  Appropriate weight gain             Education/Evaluation     Row Name 12/03/20 1318          Education    Education  Provided education regarding;Education topics;Advised regarding habits/behavior     Provided education regarding  Diet rationale;Key food habit change;Nutrition related factor     Education Topics  Basic nutrition;Protein;Weight gain strategy     Advised Regarding Habits/Behavior  Appropriate beverage;Appropriate portions;Increased nutrient density;Snacks;Use supplement;Weight gain strategy encouraged Boost Plus with ice cream TID; encouraged fruit serving minimum TID;  encouraged beans minimum 1-2 servings daily. Advised of how to add kcal/protein to diet        Monitor/Evaluation    Monitor  Per protocol if Pt begins radiation, will follow up weekly in clinic     Education Follow-up  Reinforce PRN           Electronically signed by:  Nadya Mcgowan RDN, LD  12/03/20 13:20 CST

## 2020-12-08 NOTE — H&P (VIEW-ONLY)
Subjective     Chief Complaint   Patient presents with   • Results     Discuss medicaitons, and what is going on with pt.        History of Present Illness  A 3-year-old female who was seen in her home.  The patient has new diagnosis of cancer with metastatic disease.  She continues to have left arm immobility.  She has handgrip, but her arm is immobile.  She is recently seen radiation oncology.  She was DC'd from the Decadron 6 daily and started on Decadron 4 mg p.o. twice daily as well as Nexium for her stomach.  She is anticipated to have a biopsy on Friday.  The patient states that she does not have any pain.  I did discuss with the patient that I thought it might be of her benefit to have a caregiver come and stay with her as have had several family members contact me about concern for her care.  The patient states that she is not having any problems with her bowels at this time, and her bowels moved today.  She states she is eating appropriately.  She states she is somewhat tired today.    Patient's PMR from outside medical facility reviewed and noted.    Review of Systems   Constitutional: Positive for fatigue. Negative for chills and fever.   HENT: Negative for congestion and sore throat.    Respiratory: Negative for cough and shortness of breath.    Cardiovascular: Negative for chest pain and leg swelling.   Gastrointestinal: Negative for constipation and vomiting.   Genitourinary: Negative for dysuria and hematuria.   Musculoskeletal:        Immobility of the left arm   Skin:        Bruising on her bilateral lower extremities.   Neurological: Positive for weakness.      Otherwise complete ROS reviewed and negative except as mentioned in the HPI.    Past Medical History:   Past Medical History:   Diagnosis Date   • Broken arm     left   • Hypertension      Past Surgical History:  Past Surgical History:   Procedure Laterality Date   • CHOLECYSTECTOMY     • FRACTURE SURGERY Left 2019    arm   •  HYSTERECTOMY       Social History:  reports that she has never smoked. She has never used smokeless tobacco. She reports current alcohol use. She reports that she does not use drugs.    Family History: family history includes Cancer in her sister; Heart attack in her brother; Lung disease in her father.      Allergies:  No Known Allergies  Medications:  Prior to Admission medications    Medication Sig Start Date End Date Taking? Authorizing Provider   apixaban (ELIQUIS) 2.5 MG tablet tablet Take 1 tablet by mouth Every 12 (Twelve) Hours. 11/16/20  Yes Love Cordon DO   atenolol (TENORMIN) 50 MG tablet Take 1 tablet by mouth Daily. 10/1/20  Yes Love Cordon DO   dexamethasone (DECADRON) 4 MG tablet Take 1 tablet by mouth 2 (Two) Times a Day With Meals. 11/30/20  Yes Aida Klein PA-C   docusate sodium (COLACE) 100 MG capsule Take 100 mg by mouth Daily. 10/3/19  Yes Juan Mo MD   lactulose (CHRONULAC) 10 GM/15ML solution Take 30 mL by mouth At Night As Needed (Constipation). 9/14/20  Yes Love Cordon DO   megestrol (MEGACE) 40 MG tablet Take 40 mg by mouth Every Morning. 10/3/19  Yes Juan Mo MD   Misc. Devices (Rollator Ultra-Light) misc 1 application Daily. 12/7/20  Yes Love Cordon DO   Multiple Vitamins-Minerals (MULTIVITAMIN WITH MINERALS) tablet Take 1 tablet by mouth Daily. 7/2/20 7/2/21 Yes Love Cordon DO   mupirocin (BACTROBAN) 2 % ointment APPLY THREE TIMES DAILY 5/22/20  Yes Juan Mo MD   NIFEdipine XL (Procardia XL) 60 MG 24 hr tablet Take 1 tablet by mouth Daily. 11/16/20  Yes Love Cordon DO   timolol (TIMOPTIC-XR) 0.5 % ophthalmic gel-forming Administer 0.5 drops to both eyes Daily. 6/4/20  Yes Juan Mo MD   vitamin D (ERGOCALCIFEROL) 1.25 MG (41090 UT) capsule capsule Take 1 capsule by mouth 1 (One) Time Per Week. 3/9/20  Yes Love Cordon, DO   cefdinir (OMNICEF) 300 MG capsule Take 1  capsule by mouth 2 (Two) Times a Day. 11/16/20   Love Cordon DO   dexamethasone (DECADRON) 6 MG tablet Take 1 tablet by mouth Daily With Breakfast for 30 days. 11/25/20 12/25/20  Love Cordon DO   vitamin D (ERGOCALCIFEROL) 82324 units capsule capsule Take 50,000 Units by mouth 1 (One) Time Per Week. 10/3/19   Provider, MD Juan       Objective     Vital Signs: There were no vitals taken for this visit.  Physical Exam  Vitals signs reviewed.   Constitutional:       Appearance: She is ill-appearing.      Comments: Thin and frail   HENT:      Head: Normocephalic and atraumatic.      Nose: Nose normal. No rhinorrhea.   Eyes:      General: No scleral icterus.     Conjunctiva/sclera: Conjunctivae normal.   Neck:      Musculoskeletal: Normal range of motion and neck supple.   Cardiovascular:      Rate and Rhythm: Normal rate and regular rhythm.      Heart sounds: Normal heart sounds.   Pulmonary:      Effort: Pulmonary effort is normal.      Breath sounds: Normal breath sounds.   Abdominal:      General: Bowel sounds are normal.      Palpations: Abdomen is soft.   Musculoskeletal:         General: No tenderness.      Comments: Immobility to raise the left arm to gravity.  She is able to move her fingers on the left hand and .   Skin:     General: Skin is warm and dry.      Comments: Bruising on the bilateral lower extremities.   Neurological:      General: No focal deficit present.      Mental Status: She is alert.      Cranial Nerves: No cranial nerve deficit.   Psychiatric:         Mood and Affect: Mood normal.         Behavior: Behavior normal.       Patient's There is no height or weight on file to calculate BMI. BMI is below normal parameters. Recommendations include: treating the underlying disease process.      Results Reviewed:  Glucose   Date Value Ref Range Status   09/23/2019 104 (H) 65 - 99 mg/dL Final     BUN   Date Value Ref Range Status   08/27/2020 11 10 - 36 mg/dL Final      09/23/2019 12 8 - 23 mg/dL Final     Creatinine   Date Value Ref Range Status   12/01/2020 0.80 0.60 - 1.30 mg/dL Final     Comment:     Serial Number: [id number]:  [id number]     Sodium   Date Value Ref Range Status   08/27/2020 145 (H) 134 - 144 mmol/L Final   09/23/2019 143 136 - 145 mmol/L Final     Potassium   Date Value Ref Range Status   08/27/2020 4.8 3.5 - 5.2 mmol/L Final   09/23/2019 3.9 3.5 - 5.2 mmol/L Final     Chloride   Date Value Ref Range Status   08/27/2020 102 96 - 106 mmol/L Final   09/23/2019 104 98 - 107 mmol/L Final     CO2   Date Value Ref Range Status   09/23/2019 31.0 (H) 22.0 - 29.0 mmol/L Final     Total CO2   Date Value Ref Range Status   08/27/2020 30 (H) 20 - 29 mmol/L Final     Calcium   Date Value Ref Range Status   08/27/2020 9.9 8.7 - 10.3 mg/dL Final   09/23/2019 9.1 8.2 - 9.6 mg/dL Final     ALT (SGPT)   Date Value Ref Range Status   08/27/2020 11 0 - 32 IU/L Final   09/20/2019 5 1 - 33 U/L Final     AST (SGOT)   Date Value Ref Range Status   08/27/2020 16 0 - 40 IU/L Final   09/20/2019 11 1 - 32 U/L Final     WBC   Date Value Ref Range Status   09/14/2020 8.9 3.4 - 10.8 x10E3/uL Final     Hematocrit   Date Value Ref Range Status   09/14/2020 44.9 34.0 - 46.6 % Final   09/23/2019 38.7 34.0 - 46.6 % Final     Platelets   Date Value Ref Range Status   09/14/2020 441 150 - 450 x10E3/uL Final   09/23/2019 270 140 - 450 10*3/mm3 Final     Total Cholesterol   Date Value Ref Range Status   09/20/2019 132 0 - 200 mg/dL Final     Triglycerides   Date Value Ref Range Status   09/20/2019 78 0 - 150 mg/dL Final     HDL Cholesterol   Date Value Ref Range Status   09/20/2019 49 40 - 60 mg/dL Final     LDL Cholesterol    Date Value Ref Range Status   09/20/2019 67 0 - 100 mg/dL Final     LDL/HDL Ratio   Date Value Ref Range Status   09/20/2019 1.38  Final         Assessment / Plan     Assessment/Plan:  1. Debility  - Misc. Devices (Rollator Ultra-Light) misc; 1 application Daily.  Dispense:  1 each; Refill: 0    2.  Immobility of the left arm  -That secondary to spinal or brain metastatic disease awaiting radiation oncology therapy    3.  Lung lesion  -Anticipated biopsy on Friday    4.  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 twice daily    Return in about 2 weeks (around 12/21/2020) for Recheck, Next scheduled follow up. unless patient needs to be seen sooner or acute issues arise.    Code Status: Full    I have discussed the patient results/orders and and plan/recommendation with them at today's visit.      Love Cordon, DO   12/07/2020

## 2020-12-08 NOTE — PROGRESS NOTES
Subjective     Chief Complaint   Patient presents with   • Results     Discuss medicaitons, and what is going on with pt.        History of Present Illness  A 89-year-old female who was seen in her home.  The patient has new diagnosis of cancer with metastatic disease.  She continues to have left arm immobility.  She has handgrip, but her arm is immobile.  She is recently seen radiation oncology.  She was DC'd from the Decadron 6 daily and started on Decadron 4 mg p.o. twice daily as well as Nexium for her stomach.  She is anticipated to have a biopsy on Friday.  The patient states that she does not have any pain.  I did discuss with the patient that I thought it might be of her benefit to have a caregiver come and stay with her as have had several family members contact me about concern for her care.  The patient states that she is not having any problems with her bowels at this time, and her bowels moved today.  She states she is eating appropriately.  She states she is somewhat tired today.    Patient's PMR from outside medical facility reviewed and noted.    Review of Systems   Constitutional: Positive for fatigue. Negative for chills and fever.   HENT: Negative for congestion and sore throat.    Respiratory: Negative for cough and shortness of breath.    Cardiovascular: Negative for chest pain and leg swelling.   Gastrointestinal: Negative for constipation and vomiting.   Genitourinary: Negative for dysuria and hematuria.   Musculoskeletal:        Immobility of the left arm   Skin:        Bruising on her bilateral lower extremities.   Neurological: Positive for weakness.      Otherwise complete ROS reviewed and negative except as mentioned in the HPI.    Past Medical History:   Past Medical History:   Diagnosis Date   • Broken arm     left   • Hypertension      Past Surgical History:  Past Surgical History:   Procedure Laterality Date   • CHOLECYSTECTOMY     • FRACTURE SURGERY Left 2019    arm   •  HYSTERECTOMY       Social History:  reports that she has never smoked. She has never used smokeless tobacco. She reports current alcohol use. She reports that she does not use drugs.    Family History: family history includes Cancer in her sister; Heart attack in her brother; Lung disease in her father.      Allergies:  No Known Allergies  Medications:  Prior to Admission medications    Medication Sig Start Date End Date Taking? Authorizing Provider   apixaban (ELIQUIS) 2.5 MG tablet tablet Take 1 tablet by mouth Every 12 (Twelve) Hours. 11/16/20  Yes Love Cordon DO   atenolol (TENORMIN) 50 MG tablet Take 1 tablet by mouth Daily. 10/1/20  Yes Love Cordon DO   dexamethasone (DECADRON) 4 MG tablet Take 1 tablet by mouth 2 (Two) Times a Day With Meals. 11/30/20  Yes Aida Klein PA-C   docusate sodium (COLACE) 100 MG capsule Take 100 mg by mouth Daily. 10/3/19  Yes Juan Mo MD   lactulose (CHRONULAC) 10 GM/15ML solution Take 30 mL by mouth At Night As Needed (Constipation). 9/14/20  Yes Love Cordon DO   megestrol (MEGACE) 40 MG tablet Take 40 mg by mouth Every Morning. 10/3/19  Yes Juan Mo MD   Misc. Devices (Rollator Ultra-Light) misc 1 application Daily. 12/7/20  Yes Love Cordon DO   Multiple Vitamins-Minerals (MULTIVITAMIN WITH MINERALS) tablet Take 1 tablet by mouth Daily. 7/2/20 7/2/21 Yes Love Cordon DO   mupirocin (BACTROBAN) 2 % ointment APPLY THREE TIMES DAILY 5/22/20  Yes Juan Mo MD   NIFEdipine XL (Procardia XL) 60 MG 24 hr tablet Take 1 tablet by mouth Daily. 11/16/20  Yes Love Cordon DO   timolol (TIMOPTIC-XR) 0.5 % ophthalmic gel-forming Administer 0.5 drops to both eyes Daily. 6/4/20  Yes Juan Mo MD   vitamin D (ERGOCALCIFEROL) 1.25 MG (99060 UT) capsule capsule Take 1 capsule by mouth 1 (One) Time Per Week. 3/9/20  Yes Love Cordon, DO   cefdinir (OMNICEF) 300 MG capsule Take 1  capsule by mouth 2 (Two) Times a Day. 11/16/20   Love Cordon DO   dexamethasone (DECADRON) 6 MG tablet Take 1 tablet by mouth Daily With Breakfast for 30 days. 11/25/20 12/25/20  Love Cordon DO   vitamin D (ERGOCALCIFEROL) 97720 units capsule capsule Take 50,000 Units by mouth 1 (One) Time Per Week. 10/3/19   Provider, MD Juan       Objective     Vital Signs: There were no vitals taken for this visit.  Physical Exam  Vitals signs reviewed.   Constitutional:       Appearance: She is ill-appearing.      Comments: Thin and frail   HENT:      Head: Normocephalic and atraumatic.      Nose: Nose normal. No rhinorrhea.   Eyes:      General: No scleral icterus.     Conjunctiva/sclera: Conjunctivae normal.   Neck:      Musculoskeletal: Normal range of motion and neck supple.   Cardiovascular:      Rate and Rhythm: Normal rate and regular rhythm.      Heart sounds: Normal heart sounds.   Pulmonary:      Effort: Pulmonary effort is normal.      Breath sounds: Normal breath sounds.   Abdominal:      General: Bowel sounds are normal.      Palpations: Abdomen is soft.   Musculoskeletal:         General: No tenderness.      Comments: Immobility to raise the left arm to gravity.  She is able to move her fingers on the left hand and .   Skin:     General: Skin is warm and dry.      Comments: Bruising on the bilateral lower extremities.   Neurological:      General: No focal deficit present.      Mental Status: She is alert.      Cranial Nerves: No cranial nerve deficit.   Psychiatric:         Mood and Affect: Mood normal.         Behavior: Behavior normal.       Patient's There is no height or weight on file to calculate BMI. BMI is below normal parameters. Recommendations include: treating the underlying disease process.      Results Reviewed:  Glucose   Date Value Ref Range Status   09/23/2019 104 (H) 65 - 99 mg/dL Final     BUN   Date Value Ref Range Status   08/27/2020 11 10 - 36 mg/dL Final      09/23/2019 12 8 - 23 mg/dL Final     Creatinine   Date Value Ref Range Status   12/01/2020 0.80 0.60 - 1.30 mg/dL Final     Comment:     Serial Number: [id number]:  [id number]     Sodium   Date Value Ref Range Status   08/27/2020 145 (H) 134 - 144 mmol/L Final   09/23/2019 143 136 - 145 mmol/L Final     Potassium   Date Value Ref Range Status   08/27/2020 4.8 3.5 - 5.2 mmol/L Final   09/23/2019 3.9 3.5 - 5.2 mmol/L Final     Chloride   Date Value Ref Range Status   08/27/2020 102 96 - 106 mmol/L Final   09/23/2019 104 98 - 107 mmol/L Final     CO2   Date Value Ref Range Status   09/23/2019 31.0 (H) 22.0 - 29.0 mmol/L Final     Total CO2   Date Value Ref Range Status   08/27/2020 30 (H) 20 - 29 mmol/L Final     Calcium   Date Value Ref Range Status   08/27/2020 9.9 8.7 - 10.3 mg/dL Final   09/23/2019 9.1 8.2 - 9.6 mg/dL Final     ALT (SGPT)   Date Value Ref Range Status   08/27/2020 11 0 - 32 IU/L Final   09/20/2019 5 1 - 33 U/L Final     AST (SGOT)   Date Value Ref Range Status   08/27/2020 16 0 - 40 IU/L Final   09/20/2019 11 1 - 32 U/L Final     WBC   Date Value Ref Range Status   09/14/2020 8.9 3.4 - 10.8 x10E3/uL Final     Hematocrit   Date Value Ref Range Status   09/14/2020 44.9 34.0 - 46.6 % Final   09/23/2019 38.7 34.0 - 46.6 % Final     Platelets   Date Value Ref Range Status   09/14/2020 441 150 - 450 x10E3/uL Final   09/23/2019 270 140 - 450 10*3/mm3 Final     Total Cholesterol   Date Value Ref Range Status   09/20/2019 132 0 - 200 mg/dL Final     Triglycerides   Date Value Ref Range Status   09/20/2019 78 0 - 150 mg/dL Final     HDL Cholesterol   Date Value Ref Range Status   09/20/2019 49 40 - 60 mg/dL Final     LDL Cholesterol    Date Value Ref Range Status   09/20/2019 67 0 - 100 mg/dL Final     LDL/HDL Ratio   Date Value Ref Range Status   09/20/2019 1.38  Final         Assessment / Plan     Assessment/Plan:  1. Debility  - Misc. Devices (Rollator Ultra-Light) misc; 1 application Daily.  Dispense:  1 each; Refill: 0    2.  Immobility of the left arm  -That secondary to spinal or brain metastatic disease awaiting radiation oncology therapy    3.  Lung lesion  -Anticipated biopsy on Friday    4.  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 twice daily    Return in about 2 weeks (around 12/21/2020) for Recheck, Next scheduled follow up. unless patient needs to be seen sooner or acute issues arise.    Code Status: Full    I have discussed the patient results/orders and and plan/recommendation with them at today's visit.      Love Cordon, DO   12/07/2020

## 2020-12-11 NOTE — INTERVAL H&P NOTE
H&P updated. The patient was examined and the following changes are noted:  Correction patient is 89 years old  Risks, alternatives and benefits explained to the patient. All questions were answered prior to the exam. Plan is for local anesthesia with possible moderate sedation.

## 2020-12-14 PROBLEM — R53.1 LEFT-SIDED WEAKNESS: Status: ACTIVE | Noted: 2020-01-01

## 2020-12-14 NOTE — TELEPHONE ENCOUNTER
Then have  them send her to the ED. She should have post biopsy instructions on when to restarted her blood thinner. Home adrianna can come out and do a UA, please call and give order. Patient probably needs fluids as well as her urine could be dark due to concentration from dehydration.

## 2020-12-14 NOTE — TELEPHONE ENCOUNTER
Neal called and stated Grace couldn't get up out of bed (per sitter), and urinated all over the bed, only taken 1 blood thinner since sat?,  Hurting on left side of rib, urine is dark in color.   Also he states if he cannot get her our of bed,  He may just have to call ambulance

## 2020-12-14 NOTE — TELEPHONE ENCOUNTER
Marisela has spoken with grandson, and he is stating she is weak and not eating, hes worried about her.  Dr Cordon said she probably needs to go to Hospital and she needs to go to [facility], so if they need to do treatment they can try to do it while she in hospital.     Marisela spoke with Lucian Beck Again and he will call ambulance.

## 2020-12-15 PROBLEM — E43 SEVERE MALNUTRITION: Status: ACTIVE | Noted: 2020-01-01

## 2020-12-17 NOTE — TELEPHONE ENCOUNTER
Spoke with Neal Blackwell.  Told him to go by the list the hospital sends home with him, and make sure she is taking those medications.

## 2020-12-18 NOTE — TELEPHONE ENCOUNTER
Patient erin Neal Blackwell called and advised that patient is experiencing pain still and she is requesting another medication other that the Tylenol provided.     Please contact katya and advise if able to call in Rx @ [phone]      [facility] confirmed.

## 2020-12-18 NOTE — OUTREACH NOTE
Call Center TCM Note      Responses   [facility] patient discharged from?  [place]   Does the patient have one of the following disease processes/diagnoses(primary or secondary)?  Other   TCM attempt successful?  Yes   Call start time  1212   Call end time  1215   Meds reviewed with patient/caregiver?  Yes   Is the patient having any side effects they believe may be caused by any medication additions or changes?  No   Does the patient have all medications ordered at discharge?  N/A   Is the patient taking all medications as directed (includes completed medication regime)?  Yes   Does the patient have a primary care provider?   Yes   Does the patient have an appointment with their PCP within 7 days of discharge?  Greater than 7 days   Comments regarding PCP  PCP APPOINTMENT IS JANUARY 4. 2021   What is preventing the patient from scheduling follow up appointments within 7 days of discharge?  Earlier appointment not available   Nursing Interventions  Verified appointment date/time/provider   Has the patient kept scheduled appointments due by today?  N/A   What is the Home health agency?   [facility], will transition to hospice after palliative radiation tx complete   Has home health visited the patient within 72 hours of discharge?  Yes   What DME was ordered?  hospital bed, BSC from [facility]   Has all DME been delivered?  Yes   Did the patient receive a copy of their discharge instructions?  Yes   Nursing interventions  Reviewed instructions with patient   What is the patient's perception of their health status since discharge?  Improving   Is the patient/caregiver able to teach back signs and symptoms related to disease process for when to call PCP?  Yes   Is the patient/caregiver able to teach back signs and symptoms related to disease process for when to call 911?  Yes   Is the patient/caregiver able to teach back the hierarchy of who to call/visit for symptoms/problems? PCP, Specialist, Home health nurse,  Urgent Care, ED, 911  Yes   If the patient is a current smoker, are they able to teach back resources for cessation?  Not a smoker   TCM call completed?  Yes          Gabriella Escobedo LPN    12/18/2020, 12:17 EST

## 2020-12-18 NOTE — TELEPHONE ENCOUNTER
Family called.  Were not able to get here here for treatment this morning. They left message.  12:04 pm  I returned call to Neal Yeboah at [phone].  They have spoken with Love Cordon MD office re: pain management med options today.  Tylenol was not enough.  He plans on trying again Monday for the 10 am time.  I told him if he thought a different time would help he would need to speak to therapists about time.  He verbalized understanding.

## 2020-12-18 NOTE — OUTREACH NOTE
Prep Survey      Responses   [facility] patient discharged from?  [place]   Is LACE score < 7 ?  No   Eligibility  [facility]   Date of Admission  12/14/20   Date of Discharge  12/17/20   Discharge Disposition  Home or Self Care   Discharge diagnosis  severe malnutrition, failure to thrive, Lung CA with bone & brain mets on radiation   Does the patient have one of the following disease processes/diagnoses(primary or secondary)?  Other   Does the patient have Home health ordered?  Yes   What is the Home health agency?   [facility], will transition to hospice after palliative radiation tx complete   Is there a DME ordered?  Yes   What DME was ordered?  hospital bed, BSC from [facility]   Prep survey completed?  Yes          Kenzie Allen RN

## 2020-12-19 NOTE — PROGRESS NOTES
Subjective     Chief Complaint   Patient presents with   • Results       History of Present Illness  89-year-old female who has probable metastatic lung cancer.  The patient was seen in her home.  She has no complaints at this time other than generalized weakness.  I discussed with the patient the importance of follow-up with radiation oncology.  She continues to not be able to move her left arm.  She states her hand is working but she has to lift the sleeve of her shirt in order to get her arm to move.  She has no back pain.  She has occasional constipation.  She has no chest pain.  She does have family and friends that are staying with her.  She states her appetite has been good, and she is eating good.  Asked the patient if she can make the early morning appointment, and she said yes.  She does have a .    Patient's PMR from outside medical facility reviewed and noted.    Review of Systems   Constitutional: Negative for chills and fever.   Respiratory: Negative for cough and shortness of breath.    Gastrointestinal: Positive for constipation. Negative for abdominal pain.   Musculoskeletal: Negative for arthralgias and back pain.        Inability to lift the left arm against gravity.   on the left is diminished but intact.   Skin: Negative for color change and wound.   Neurological: Positive for weakness. Negative for dizziness and headaches.      Otherwise complete ROS reviewed and negative except as mentioned in the HPI.    Past Medical History:   Past Medical History:   Diagnosis Date   • Broken arm     left   • Hypertension      Past Surgical History:  Past Surgical History:   Procedure Laterality Date   • CHOLECYSTECTOMY     • FRACTURE SURGERY Left 2019    arm   • HYSTERECTOMY       Social History:  reports that she has never smoked. She has never used smokeless tobacco. She reports current alcohol use. She reports that she does not use drugs.    Family History: family history includes  Cancer in her sister; Heart attack in her brother; Lung disease in her father.       Allergies:  No Known Allergies  Medications:  Prior to Admission medications    Medication Sig Start Date End Date Taking? Authorizing Provider   atenolol (TENORMIN) 50 MG tablet Take 1 tablet by mouth Daily. 10/1/20   Love Cordon DO   dexamethasone (DECADRON) 4 MG tablet Take 1 tablet by mouth 2 (Two) Times a Day With Meals. 11/30/20   Aida Klein PA-C   docusate sodium (COLACE) 100 MG capsule Take 100 mg by mouth Daily. 10/3/19   Juan Mo MD   lactulose (CHRONULAC) 10 GM/15ML solution Take 30 mL by mouth At Night As Needed (Constipation). 9/14/20   Love Cordon DO   megestrol (MEGACE) 40 MG tablet Take 40 mg by mouth Every Morning. 10/3/19   Juan Mo MD   Misc. Devices (Rollator Ultra-Light) misc 1 application Daily. 12/7/20   Love Cordon DO   Multiple Vitamins-Minerals (MULTIVITAMIN WITH MINERALS) tablet Take 1 tablet by mouth Daily. 7/2/20 7/2/21  Love Cordon DO   mupirocin (BACTROBAN) 2 % ointment APPLY THREE TIMES DAILY 5/22/20   Juan Mo MD   NIFEdipine XL (Procardia XL) 60 MG 24 hr tablet Take 1 tablet by mouth Daily. 11/16/20   Love Cordon DO   oxyCODONE-acetaminophen (Percocet) 5-325 MG per tablet Take 1 tablet by mouth Every 8 (Eight) Hours As Needed for Moderate Pain . 12/19/20   Love Cordon DO   timolol (TIMOPTIC-XR) 0.5 % ophthalmic gel-forming Administer 0.5 drops to both eyes Daily. 6/4/20   Juan Mo MD   vitamin D (ERGOCALCIFEROL) 03650 units capsule capsule Take 50,000 Units by mouth 1 (One) Time Per Week. 10/3/19   Juan Mo MD       Objective     Vital Signs: There were no vitals taken for this visit.  Physical Exam  Constitutional:       General: She is not in acute distress.     Comments: Frail and thin   HENT:      Head: Normocephalic and atraumatic.      Nose: Nose normal. No  rhinorrhea.      Mouth/Throat:      Mouth: Mucous membranes are moist.      Pharynx: No posterior oropharyngeal erythema.   Eyes:      General: No scleral icterus.     Extraocular Movements: Extraocular movements intact.   Cardiovascular:      Rate and Rhythm: Normal rate and regular rhythm.   Pulmonary:      Effort: Pulmonary effort is normal. No respiratory distress.   Musculoskeletal:      Right lower leg: No edema.      Left lower leg: No edema.      Comments: Inability to lift the left arm against gravity.   is diminished on the left.   Skin:     General: Skin is warm.      Findings: Bruising present.      Comments: Chronic skin changes from anticoagulation   Neurological:      Mental Status: She is alert.      Cranial Nerves: No cranial nerve deficit.      Motor: Weakness present.   Psychiatric:         Mood and Affect: Mood normal.         Behavior: Behavior normal.         Patient's There is no height or weight on file to calculate BMI. BMI is below normal parameters. Recommendations include: treating the underlying disease process.      Results Reviewed:  Glucose   Date Value Ref Range Status   12/15/2020 156 (H) 65 - 99 mg/dL Final     BUN   Date Value Ref Range Status   12/15/2020 27 (H) 8 - 23 mg/dL Final     Creatinine   Date Value Ref Range Status   12/15/2020 0.36 (L) 0.57 - 1.00 mg/dL Final   12/01/2020 0.80 0.60 - 1.30 mg/dL Final     Comment:     Serial Number: [id number]:  [id number]     Sodium   Date Value Ref Range Status   12/15/2020 142 136 - 145 mmol/L Final     Potassium   Date Value Ref Range Status   12/15/2020 4.7 3.5 - 5.2 mmol/L Final     Comment:     Slight hemolysis detected by analyzer. Results may be affected.     Chloride   Date Value Ref Range Status   12/15/2020 105 98 - 107 mmol/L Final     CO2   Date Value Ref Range Status   12/15/2020 27.0 22.0 - 29.0 mmol/L Final     Calcium   Date Value Ref Range Status   12/15/2020 8.6 8.2 - 9.6 mg/dL Final     ALT (SGPT)   Date Value  Ref Range Status   12/15/2020 66 (H) 1 - 33 U/L Final     AST (SGOT)   Date Value Ref Range Status   12/15/2020 28 1 - 32 U/L Final     WBC   Date Value Ref Range Status   12/15/2020 14.48 (H) 3.40 - 10.80 10*3/mm3 Final   09/14/2020 8.9 3.4 - 10.8 x10E3/uL Final     Hematocrit   Date Value Ref Range Status   12/15/2020 57.1 (H) 34.0 - 46.6 % Final     Platelets   Date Value Ref Range Status   12/15/2020 170 140 - 450 10*3/mm3 Final     Total Cholesterol   Date Value Ref Range Status   09/20/2019 132 0 - 200 mg/dL Final     Triglycerides   Date Value Ref Range Status   09/20/2019 78 0 - 150 mg/dL Final     HDL Cholesterol   Date Value Ref Range Status   09/20/2019 49 40 - 60 mg/dL Final     LDL Cholesterol    Date Value Ref Range Status   09/20/2019 67 0 - 100 mg/dL Final     LDL/HDL Ratio   Date Value Ref Range Status   09/20/2019 1.38  Final         Assessment / Plan     Assessment/Plan:  1. Lung mass      2. Brain mass      3. Generalized weakness    We discussed fall precautions in the home.  I discussed with her the importance of making the appointment with radiation oncology for follow-up.  We reviewed her medications.  We discussed side effects.  Patient voices understanding.  I again took the patient's CT scans and lab work and reviewed with her, and left and her physician.      Return in about 2 weeks (around 12/8/2020) for Recheck, Next scheduled follow up. unless patient needs to be seen sooner or acute issues arise.    Code Status: Full    I have discussed the patient results/orders and and plan/recommendation with them at today's visit.      Love Cordon, DO   11/24/2020

## 2020-12-21 NOTE — TELEPHONE ENCOUNTER
"Mr Trejo (grandson) called and left message.   \"She is too weak to come for treatments and we need to cancel her appointments.\"  He also asked about a steroid wean to get her off the steroids.  Also said they are contacting Hospice.      " The following documentation was copied from pt mother's chart. Assessment performed by TERESSA Grigsby on 4/21/18:    SW:     D: SW responding to consult. Met with patient. She reports she lives on her own with her 15 month old child, reports that her family are very supportive of her and live close to her apartment. She denies any psychosocial stressors and concerns about safety. She denies SI/HI. SW shared resources for mental health in the area. Patient reports that she has had therapy in the past and plans to establish with providers.      P: No other needs identified at this time.

## 2020-12-21 NOTE — PROGRESS NOTES
We have had communication from this patient's son who is her power of .  They have elected no further care and opted for hospice with supportive care only.    We will close her radiotherapy chart at this time but will be available should any indication for palliative radiotherapy or pain relief be necessary.

## 2020-12-22 NOTE — PROGRESS NOTES
I spoke with Ms. Oconnor grandson yesterday, AMANDA Reynaga and he confirms his grandmother would be stopping all radiation treatments due to a rapid decline in her health and was expecting Hospice yesterday. He understood he is welcome to call us if we can do anything for him.     Aida Astudillo PA-C

## 2020-12-29 ENCOUNTER — READMISSION MANAGEMENT (OUTPATIENT)
Dept: CALL CENTER | Facility: HOSPITAL | Age: 85
End: 2020-12-29

## 2020-12-29 NOTE — OUTREACH NOTE
Medical Week 2 Survey      Responses   [facility] patient discharged from?  [place]   Does the patient have one of the following disease processes/diagnoses(primary or secondary)?  Other   Week 2 attempt successful?  No   Unsuccessful attempts  Attempt 1          Tori Nielsen RN

## 2020-12-30 ENCOUNTER — READMISSION MANAGEMENT (OUTPATIENT)
Dept: CALL CENTER | Facility: HOSPITAL | Age: 85
End: 2020-12-30

## 2020-12-30 NOTE — OUTREACH NOTE
Medical Week 2 Survey      Responses   [facility] patient discharged from?  [place]   Does the patient have one of the following disease processes/diagnoses(primary or secondary)?  Other   Week 2 attempt successful?  No   Unsuccessful attempts  Attempt 2          Gabriella Escobedo LPN

## 2021-01-04 ENCOUNTER — APPOINTMENT (OUTPATIENT)
Dept: RADIATION ONCOLOGY | Facility: HOSPITAL | Age: 86
End: 2021-01-04

## 2021-01-08 ENCOUNTER — READMISSION MANAGEMENT (OUTPATIENT)
Dept: CALL CENTER | Facility: HOSPITAL | Age: 86
End: 2021-01-08

## 2021-01-08 NOTE — OUTREACH NOTE
Medical Week 3 Survey      Responses   [facility] patient discharged from?  [place]   Does the patient have one of the following disease processes/diagnoses(primary or secondary)?  Other   Week 3 attempt successful?  No   Unsuccessful attempts  Attempt 1          Tori Nielsen RN

## 2021-01-12 ENCOUNTER — READMISSION MANAGEMENT (OUTPATIENT)
Dept: CALL CENTER | Facility: HOSPITAL | Age: 86
End: 2021-01-12

## 2021-01-12 NOTE — OUTREACH NOTE
Medical Week 3 Survey      Responses   [facility] patient discharged from?  [place]   Does the patient have one of the following disease processes/diagnoses(primary or secondary)?  Other   Week 3 attempt successful?  No   Revoke  Patient           Tori Nielsen RN
